# Patient Record
Sex: FEMALE | Race: WHITE | Employment: FULL TIME | URBAN - METROPOLITAN AREA
[De-identification: names, ages, dates, MRNs, and addresses within clinical notes are randomized per-mention and may not be internally consistent; named-entity substitution may affect disease eponyms.]

---

## 2021-03-19 ENCOUNTER — APPOINTMENT (OUTPATIENT)
Dept: CT IMAGING | Age: 66
DRG: 065 | End: 2021-03-19
Payer: COMMERCIAL

## 2021-03-19 ENCOUNTER — HOSPITAL ENCOUNTER (INPATIENT)
Age: 66
LOS: 3 days | Discharge: HOME | DRG: 065 | End: 2021-03-22
Attending: EMERGENCY MEDICINE | Admitting: INTERNAL MEDICINE
Payer: COMMERCIAL

## 2021-03-19 DIAGNOSIS — R47.01 APHASIA: Primary | ICD-10-CM

## 2021-03-19 DIAGNOSIS — I63.9 ISCHEMIC STROKE (HCC): ICD-10-CM

## 2021-03-19 DIAGNOSIS — R13.12 OROPHARYNGEAL DYSPHAGIA: ICD-10-CM

## 2021-03-19 DIAGNOSIS — R29.810 FACIAL DROOP: ICD-10-CM

## 2021-03-19 LAB
ANION GAP SERPL CALCULATED.3IONS-SCNC: 8 MMOL/L (ref 3–16)
BASOPHILS ABSOLUTE: 0.1 K/UL (ref 0–0.2)
BASOPHILS RELATIVE PERCENT: 0.8 %
BUN BLDV-MCNC: 9 MG/DL (ref 7–20)
CALCIUM SERPL-MCNC: 8.7 MG/DL (ref 8.3–10.6)
CHLORIDE BLD-SCNC: 99 MMOL/L (ref 99–110)
CO2: 27 MMOL/L (ref 21–32)
CREAT SERPL-MCNC: 0.5 MG/DL (ref 0.6–1.2)
EKG ATRIAL RATE: 124 BPM
EKG DIAGNOSIS: NORMAL
EKG P AXIS: 65 DEGREES
EKG P-R INTERVAL: 172 MS
EKG Q-T INTERVAL: 306 MS
EKG QRS DURATION: 78 MS
EKG QTC CALCULATION (BAZETT): 439 MS
EKG R AXIS: 0 DEGREES
EKG T AXIS: 69 DEGREES
EKG VENTRICULAR RATE: 124 BPM
EOSINOPHILS ABSOLUTE: 0.1 K/UL (ref 0–0.6)
EOSINOPHILS RELATIVE PERCENT: 1.2 %
GFR AFRICAN AMERICAN: >60
GFR NON-AFRICAN AMERICAN: >60
GLUCOSE BLD-MCNC: 104 MG/DL (ref 70–99)
HCT VFR BLD CALC: 40.7 % (ref 36–48)
HEMOGLOBIN: 13.2 G/DL (ref 12–16)
INR BLD: 1.09 (ref 0.86–1.14)
LYMPHOCYTES ABSOLUTE: 1.5 K/UL (ref 1–5.1)
LYMPHOCYTES RELATIVE PERCENT: 20.3 %
MCH RBC QN AUTO: 28.3 PG (ref 26–34)
MCHC RBC AUTO-ENTMCNC: 32.5 G/DL (ref 31–36)
MCV RBC AUTO: 86.9 FL (ref 80–100)
MONOCYTES ABSOLUTE: 0.5 K/UL (ref 0–1.3)
MONOCYTES RELATIVE PERCENT: 6.9 %
NEUTROPHILS ABSOLUTE: 5.2 K/UL (ref 1.7–7.7)
NEUTROPHILS RELATIVE PERCENT: 70.8 %
PDW BLD-RTO: 15.2 % (ref 12.4–15.4)
PLATELET # BLD: 223 K/UL (ref 135–450)
PMV BLD AUTO: 8.4 FL (ref 5–10.5)
POTASSIUM REFLEX MAGNESIUM: 3.7 MMOL/L (ref 3.5–5.1)
PROTHROMBIN TIME: 12.7 SEC (ref 10–13.2)
RBC # BLD: 4.68 M/UL (ref 4–5.2)
SODIUM BLD-SCNC: 134 MMOL/L (ref 136–145)
TROPONIN: <0.01 NG/ML
WBC # BLD: 7.4 K/UL (ref 4–11)

## 2021-03-19 PROCEDURE — 2580000003 HC RX 258: Performed by: INTERNAL MEDICINE

## 2021-03-19 PROCEDURE — 6370000000 HC RX 637 (ALT 250 FOR IP): Performed by: INTERNAL MEDICINE

## 2021-03-19 PROCEDURE — 6360000004 HC RX CONTRAST MEDICATION: Performed by: STUDENT IN AN ORGANIZED HEALTH CARE EDUCATION/TRAINING PROGRAM

## 2021-03-19 PROCEDURE — 70498 CT ANGIOGRAPHY NECK: CPT

## 2021-03-19 PROCEDURE — 85610 PROTHROMBIN TIME: CPT

## 2021-03-19 PROCEDURE — 84484 ASSAY OF TROPONIN QUANT: CPT

## 2021-03-19 PROCEDURE — 85025 COMPLETE CBC W/AUTO DIFF WBC: CPT

## 2021-03-19 PROCEDURE — 94761 N-INVAS EAR/PLS OXIMETRY MLT: CPT

## 2021-03-19 PROCEDURE — 6360000002 HC RX W HCPCS: Performed by: INTERNAL MEDICINE

## 2021-03-19 PROCEDURE — 99284 EMERGENCY DEPT VISIT MOD MDM: CPT

## 2021-03-19 PROCEDURE — 2700000000 HC OXYGEN THERAPY PER DAY

## 2021-03-19 PROCEDURE — 2060000000 HC ICU INTERMEDIATE R&B

## 2021-03-19 PROCEDURE — 93005 ELECTROCARDIOGRAM TRACING: CPT | Performed by: STUDENT IN AN ORGANIZED HEALTH CARE EDUCATION/TRAINING PROGRAM

## 2021-03-19 PROCEDURE — 70450 CT HEAD/BRAIN W/O DYE: CPT

## 2021-03-19 PROCEDURE — 80048 BASIC METABOLIC PNL TOTAL CA: CPT

## 2021-03-19 RX ORDER — PROMETHAZINE HYDROCHLORIDE 12.5 MG/1
12.5 TABLET ORAL EVERY 6 HOURS PRN
Status: DISCONTINUED | OUTPATIENT
Start: 2021-03-19 | End: 2021-03-22 | Stop reason: HOSPADM

## 2021-03-19 RX ORDER — ASPIRIN 81 MG/1
81 TABLET ORAL DAILY
Status: DISCONTINUED | OUTPATIENT
Start: 2021-03-19 | End: 2021-03-22 | Stop reason: HOSPADM

## 2021-03-19 RX ORDER — NALOXONE HYDROCHLORIDE 0.4 MG/ML
INJECTION, SOLUTION INTRAMUSCULAR; INTRAVENOUS; SUBCUTANEOUS
Status: DISCONTINUED
Start: 2021-03-19 | End: 2021-03-19

## 2021-03-19 RX ORDER — SODIUM CHLORIDE 0.9 % (FLUSH) 0.9 %
10 SYRINGE (ML) INJECTION PRN
Status: DISCONTINUED | OUTPATIENT
Start: 2021-03-19 | End: 2021-03-22 | Stop reason: HOSPADM

## 2021-03-19 RX ORDER — ASPIRIN 300 MG/1
300 SUPPOSITORY RECTAL DAILY
Status: DISCONTINUED | OUTPATIENT
Start: 2021-03-19 | End: 2021-03-22 | Stop reason: HOSPADM

## 2021-03-19 RX ORDER — ONDANSETRON 2 MG/ML
4 INJECTION INTRAMUSCULAR; INTRAVENOUS EVERY 6 HOURS PRN
Status: DISCONTINUED | OUTPATIENT
Start: 2021-03-19 | End: 2021-03-22 | Stop reason: HOSPADM

## 2021-03-19 RX ORDER — ROSUVASTATIN CALCIUM 20 MG/1
40 TABLET, COATED ORAL NIGHTLY
Status: DISCONTINUED | OUTPATIENT
Start: 2021-03-19 | End: 2021-03-20

## 2021-03-19 RX ORDER — LABETALOL 20 MG/4 ML (5 MG/ML) INTRAVENOUS SYRINGE
10 EVERY 10 MIN PRN
Status: DISCONTINUED | OUTPATIENT
Start: 2021-03-19 | End: 2021-03-19

## 2021-03-19 RX ORDER — SODIUM CHLORIDE 9 MG/ML
INJECTION, SOLUTION INTRAVENOUS CONTINUOUS
Status: DISCONTINUED | OUTPATIENT
Start: 2021-03-19 | End: 2021-03-21

## 2021-03-19 RX ORDER — LABETALOL HYDROCHLORIDE 5 MG/ML
10 INJECTION, SOLUTION INTRAVENOUS EVERY 10 MIN PRN
Status: DISCONTINUED | OUTPATIENT
Start: 2021-03-19 | End: 2021-03-22 | Stop reason: HOSPADM

## 2021-03-19 RX ORDER — POLYETHYLENE GLYCOL 3350 17 G/17G
17 POWDER, FOR SOLUTION ORAL DAILY PRN
Status: DISCONTINUED | OUTPATIENT
Start: 2021-03-19 | End: 2021-03-22 | Stop reason: HOSPADM

## 2021-03-19 RX ORDER — SODIUM CHLORIDE 0.9 % (FLUSH) 0.9 %
10 SYRINGE (ML) INJECTION EVERY 12 HOURS SCHEDULED
Status: DISCONTINUED | OUTPATIENT
Start: 2021-03-19 | End: 2021-03-22 | Stop reason: HOSPADM

## 2021-03-19 RX ADMIN — ENOXAPARIN SODIUM 40 MG: 40 INJECTION SUBCUTANEOUS at 18:36

## 2021-03-19 RX ADMIN — SODIUM CHLORIDE: 9 INJECTION, SOLUTION INTRAVENOUS at 18:36

## 2021-03-19 RX ADMIN — ASPIRIN 81 MG: 81 TABLET, COATED ORAL at 18:36

## 2021-03-19 RX ADMIN — Medication 10 ML: at 21:00

## 2021-03-19 RX ADMIN — IOPAMIDOL 80 ML: 755 INJECTION, SOLUTION INTRAVENOUS at 11:59

## 2021-03-19 ASSESSMENT — ENCOUNTER SYMPTOMS
DIARRHEA: 0
ABDOMINAL PAIN: 0
SHORTNESS OF BREATH: 0
BACK PAIN: 0
CONSTIPATION: 0
RHINORRHEA: 0
NAUSEA: 0
COUGH: 0
VOMITING: 0
SORE THROAT: 0

## 2021-03-19 ASSESSMENT — PAIN SCALES - GENERAL: PAINLEVEL_OUTOF10: 0

## 2021-03-19 NOTE — FLOWSHEET NOTE
03/19/21 1325   Encounter Summary   Services provided to: Patient   Referral/Consult From: Rounding   Continue Visiting   (3/19/2021, [facility]. )   Complexity of Encounter Moderate   Length of Encounter 15 minutes   Routine   Type Initial   Assessment Approachable   Intervention Nurtured hope   Outcome Expressed gratitude

## 2021-03-19 NOTE — H&P
Hospital Medicine History & Physical      PCP: Referring Not In System (Inactive)    Date of Admission: 3/19/2021    Date of Service: Pt seen/examined on 03/19/21 and Admitted to Inpatient with expected LOS greater than two midnights due to medical therapy. Chief Complaint: slurred speech, facial droop on left    History Of Present Illness:      Stephanie Guerrero is a 60 y.o. female with past medical history of rectal prolapse but no other known significant history (has not seen a doctor for 20 years), who presented from her workplace after her employer noted she was not speaking properly and called 911. She says she \"felt funny\" at about 10 this morning and noticed her speech was different, seemed slurred. No vision changes. No weakness except for ?left arm weakness compared to right. She also felt like her chewing/swallowing is different. On arrival to ED she was noted to have left sided facial droop and some dysarthria. Stroke team was notified. She did not receive tPA due to mild symptoms. Past Medical History:      No past medical history on file. Past Surgical History:      No past surgical history on file. Medications Prior to Admission:      Prior to Admission medications    Not on File       Allergies:  Patient has no known allergies. Social History:      The patient currently lives in private residence. TOBACCO:   has no history on file for tobacco.  ETOH:   has no history on file for alcohol. Family History:      Reviewed in detail and positive as follows:    No family history on file. REVIEW OF SYSTEMS:   Pertinent positives as noted in the HPI. All other systems reviewed and negative. PHYSICAL EXAM:    /71   Pulse 97   Temp 98.3 °F (36.8 °C) (Oral)   Resp 30   Ht 5' (1.524 m)   Wt 243 lb 0.5 oz (110.2 kg)   SpO2 96%   BMI 47.46 kg/m²     General appearance: No apparent distress, appears stated age and cooperative.   HEENT: Normal cephalic, atraumatic without obvious deformity. Pupils equal, round, and reactive to light. Extra ocular muscles intact. Conjunctivae/corneas clear. Neck: Supple, with full range of motion. No jugular venous distention. Trachea midline. Respiratory:  Normal respiratory effort. Clear to auscultation, bilaterally without Rales/Wheezes/Rhonchi. Cardiovascular: Regular rate and rhythm with normal S1/S2 without murmurs, rubs or gallops. Abdomen: Soft, non-tender, non-distended with normal bowel sounds. Musculoskelatal: No clubbing, cyanosis or edema bilaterally. Full range of motion without deformity. Skin: Skin color, texture, turgor normal.  No rashes or lesions. Neurologic:  Left facial droop mild dysarthria however patient also has slipping denture. Strength is good and sensation is intact and symmetric  Psychiatric: Alert and oriented, thought content appropriate, normal insight    Labs:     Recent Labs     03/19/21  1222   WBC 7.4   HGB 13.2   HCT 40.7        Recent Labs     03/19/21  1222   *   K 3.7   CL 99   CO2 27   BUN 9   CREATININE 0.5*   CALCIUM 8.7     No results for input(s): AST, ALT, BILIDIR, BILITOT, ALKPHOS in the last 72 hours. Recent Labs     03/19/21  1222   INR 1.09     Recent Labs     03/19/21  1352   TROPONINI <0.01       Urinalysis:    No results found for: NITRU, WBCUA, BACTERIA, RBCUA, BLOODU, SPECGRAV, GLUCOSEU    Studies:  CTA HEAD NECK W CONTRAST   Final Result      No significant flow-limiting stenosis or occlusion involving the anterior or posterior circulation      CTA NECK:   Dose modulation, iterative reconstruction and/or weight based adjustments of the mA/kV were utilized to reduce radiation dose to as low as reasonably achievable. Multiplanar reconstructed images and MIP images for CT angiogram.      Mild reticulation with mild diffuse bilateral ground glass opacities may relate to atelectasis pneumonitis or edema or acute atypical viral illness.  Small bilateral hilar lymph nodes are noted indeterminate. There is calcification of the thoracic aorta. The proximal left subclavian artery, brachycephalic artery, proximal right subclavian artery are patent without significant flow-limiting stenosis or occlusion. The left common carotid artery and right common    carotid artery patent without significant flow-limiting stenosis or occlusion. Mild calcification is noted. The internal carotid arteries are patent without significant flow-limiting stenosis or occlusion. The right and left vertebral arteries are patent without significant flow-limiting stenosis or occlusion. The basilar artery is patent without significant flow-limiting stenosis or occlusion. IMPRESSION:      No significant flow-limiting stenosis or occlusion involving the common carotid or internal carotid arteries or vertebral arteries. Reticulation with groundglass opacities involving the visualized lungs may relate to atelectasis pneumonitis or edema. Mildly enlarged visualized bilateral hilar lymph nodes may be inflammatory, infectious or neoplastic. CT HEAD WO CONTRAST   Final Result      No acute hemorrhage. ASSESSMENT:    Active Hospital Problems    Diagnosis Date Noted    Ischemic stroke ([facility] [address] [I63.9] 03/19/2021   HTN  ? Dysphagia  Sinus tachycardia    PLAN:    ASA, statin  Permissive HTN for first 24 hours  Labs: Lipids, Hgb A1c  CT head, CTA head with no acute findings  MRI brain pending  Echocardiogram with bubble  Telemetry   Neurology consulted, appreciate recs  PT/OT  NPO pending SLP evaluation  Gentle IV fluids  Monitor blood pressure    DVT Prophylaxis: Lovenox  Diet: No diet orders on file  Code Status: No Order    PT/OT Eval Status: pending    Dispo - Inpatient    Amaya Lanier DO

## 2021-03-19 NOTE — ED PROVIDER NOTES
[address] [facility]          ATTENDING PHYSICIAN NOTE       Date of evaluation: 3/19/2021    Chief Complaint     No chief complaint on file. History of Present Illness     Sofia Barnes is an otherwise healthy 60 y.o. female who presents with slurred speech. Patient states that approximately 1030 this morning she had sudden onset slurred speech while eating breakfast.  Coworkers also noted that she had left sided facial droop at that time. No weakness or sensation changes. She denies history of similar problems. Patient is not anticoagulated. She feels otherwise well. No fevers or chills. No headache. No vision changes. No chest pain or shortness of breath. No abdominal pain, nausea, vomiting. Review of Systems     Review of Systems   Constitutional: Negative for chills and fever. HENT: Negative for congestion, rhinorrhea and sore throat. Respiratory: Negative for cough and shortness of breath. Cardiovascular: Negative for chest pain and leg swelling. Gastrointestinal: Negative for abdominal pain, constipation, diarrhea, nausea and vomiting. Genitourinary: Negative for dysuria, frequency, hematuria and urgency. Musculoskeletal: Negative for back pain and neck pain. Skin: Negative for rash and wound. Neurological: Positive for facial asymmetry (Left side) and speech difficulty. Negative for syncope, weakness, light-headedness, numbness and headaches. All other systems reviewed and are negative. Past Medical, Surgical, Family, and Social History     She has no past medical history on file. She has no past surgical history on file. Her family history is not on file. She     Medications     Previous Medications    No medications on file       Allergies     She has No Known Allergies.     Physical Exam     INITIAL VITALS: BP: (!) 209/110, Temp: 98.3 °F (36.8 °C), Pulse: 128, Resp: 18, SpO2: 90 %   Physical Exam  Constitutional: General: She is not in acute distress. Appearance: Normal appearance. She is normal weight. She is not ill-appearing or toxic-appearing. HENT:      Head: Normocephalic and atraumatic. Nose: Nose normal.      Mouth/Throat:      Mouth: Mucous membranes are moist.   Eyes:      Extraocular Movements: Extraocular movements intact. Conjunctiva/sclera: Conjunctivae normal.      Pupils: Pupils are equal, round, and reactive to light. Neck:      Musculoskeletal: Normal range of motion. Cardiovascular:      Rate and Rhythm: Normal rate. Heart sounds: No murmur. Pulmonary:      Effort: Pulmonary effort is normal. No respiratory distress. Breath sounds: Normal breath sounds. No wheezing or rales. Abdominal:      General: Abdomen is flat. There is no distension. Palpations: Abdomen is soft. Tenderness: There is no abdominal tenderness. Musculoskeletal: Normal range of motion. Skin:     General: Skin is warm and dry. Capillary Refill: Capillary refill takes less than 2 seconds. Findings: No rash. Neurological:      Mental Status: She is alert. Comments: Minor left lower facial droop present. Slurred speech present. CNII-XII otherwise intact. No pronator drift. 5/5 strength to bilateral upper and lower extremities. Sensation intact throughout. NIH STROKE SCALE  NIH Stroke Scale  Interval: Baseline  Level of Consciousness (1a. ): Alert  LOC Questions (1b. ):  Answers both correctly  LOC Commands (1c. ): Performs both tasks correctly  Best Gaze (2. ): Normal  Visual (3. ): No visual loss  Facial Palsy (4. ): (!) Minor paralysis  Motor Arm, Left (5a. ): No drift  Motor Arm, Right (5b. ): No drift  Motor Leg, Left (6a. ): No drift  Motor Leg, Right (6b. ): No drift  Limb Ataxia (7. ): Absent  Sensory (8. ): Normal  Best Language (9. ): No aphasia  Dysarthria (10. ): Mild to moderate dysarthria  Extinction and Inattention (11): No abnormality  Total: 2 Diagnostic Results     EKG   EKG Interpretation    Interpreted by emergency department physician    Rhythm: sinus tachycardia  Rate: 120-130  Axis: normal  Ectopy: none  Conduction: normal  ST Segments: no acute change  T Waves: no acute change  Q Waves: none    Clinical Impression: Sinus tachycardia, no ST or T wave changes    Kenton Hahn      RADIOLOGY:  CTA HEAD NECK W CONTRAST   Final Result      No significant flow-limiting stenosis or occlusion involving the anterior or posterior circulation      CTA NECK:   Dose modulation, iterative reconstruction and/or weight based adjustments of the mA/kV were utilized to reduce radiation dose to as low as reasonably achievable. Multiplanar reconstructed images and MIP images for CT angiogram.      Mild reticulation with mild diffuse bilateral ground glass opacities may relate to atelectasis pneumonitis or edema or acute atypical viral illness. Small bilateral hilar lymph nodes are noted indeterminate. There is calcification of the thoracic aorta. The proximal left subclavian artery, brachycephalic artery, proximal right subclavian artery are patent without significant flow-limiting stenosis or occlusion. The left common carotid artery and right common    carotid artery patent without significant flow-limiting stenosis or occlusion. Mild calcification is noted. The internal carotid arteries are patent without significant flow-limiting stenosis or occlusion. The right and left vertebral arteries are patent without significant flow-limiting stenosis or occlusion. The basilar artery is patent without significant flow-limiting stenosis or occlusion. IMPRESSION:      No significant flow-limiting stenosis or occlusion involving the common carotid or internal carotid arteries or vertebral arteries. Reticulation with groundglass opacities involving the visualized lungs may relate to atelectasis pneumonitis or edema.  Mildly enlarged visualized bilateral hilar lymph nodes may be inflammatory, infectious or neoplastic. CT HEAD WO CONTRAST   Final Result      No acute hemorrhage. LABS:   Results for orders placed or performed during the hospital encounter of 03/19/21   CBC Auto Differential   Result Value Ref Range    WBC 7.4 4.0 - 11.0 K/uL    RBC 4.68 4.00 - 5.20 M/uL    Hemoglobin 13.2 12.0 - 16.0 g/dL    Hematocrit 40.7 36.0 - 48.0 %    MCV 86.9 80.0 - 100.0 fL    MCH 28.3 26.0 - 34.0 pg    MCHC 32.5 31.0 - 36.0 g/dL    RDW 15.2 12.4 - 15.4 %    Platelets 528 558 - 317 K/uL    MPV 8.4 5.0 - 10.5 fL    Neutrophils % 70.8 %    Lymphocytes % 20.3 %    Monocytes % 6.9 %    Eosinophils % 1.2 %    Basophils % 0.8 %    Neutrophils Absolute 5.2 1.7 - 7.7 K/uL    Lymphocytes Absolute 1.5 1.0 - 5.1 K/uL    Monocytes Absolute 0.5 0.0 - 1.3 K/uL    Eosinophils Absolute 0.1 0.0 - 0.6 K/uL    Basophils Absolute 0.1 0.0 - 0.2 K/uL   Basic Metabolic Panel w/ Reflex to MG   Result Value Ref Range    Sodium 134 (L) 136 - 145 mmol/L    Potassium reflex Magnesium 3.7 3.5 - 5.1 mmol/L    Chloride 99 99 - 110 mmol/L    CO2 27 21 - 32 mmol/L    Anion Gap 8 3 - 16    Glucose 104 (H) 70 - 99 mg/dL    BUN 9 7 - 20 mg/dL    CREATININE 0.5 (L) 0.6 - 1.2 mg/dL    GFR Non-African American >60 >60    GFR African American >60 >60    Calcium 8.7 8.3 - 10.6 mg/dL   Protime-INR   Result Value Ref Range    Protime 12.7 10.0 - 13.2 sec    INR 1.09 0.86 - 1.14   Troponin   Result Value Ref Range    Troponin <0.01 <0.01 ng/mL   EKG 12 Lead   Result Value Ref Range    Ventricular Rate 124 BPM    Atrial Rate 124 BPM    P-R Interval 172 ms    QRS Duration 78 ms    Q-T Interval 306 ms    QTc Calculation (Bazett) 439 ms    P Axis 65 degrees    R Axis 0 degrees    T Axis 69 degrees    Diagnosis       EKG performed in ER and to be interpreted by ER physician. Confirmed by MD, ER ([id number]),  Yasmany Lorenzo (ANDREW), NORBERT ([id number]) on 3/19/2021 1:13:18 PM       RECENT VITALS:  BP: 136/71, Temp: 98.3 °F (36.8 °C), Pulse: 97, Resp: 30, SpO2: 96 %    Procedures     None. ED Course     Nursing Notes, Past Medical Hx, Past Surgical Hx, Social Hx, Allergies, and Family Hx were reviewed. The patient was given the following medications:  Orders Placed This Encounter   Medications    iopamidol (ISOVUE-370) 76 % injection 80 mL    naloxone (NARCAN) 0.4 MG/ML injection     Gordon Thompson: cabinet override    alteplase (ACTIVASE) 100 MG injection     Ananth Moore: cabinet override       CONSULTS:  Cara Camejo was contacted with hospital medicine. Robinson Fuchs and Dr. Cassie Birmingham were contacted as part of the [place] stroke team.  They recommend proceeding with CT/CTA. After reading the CT/CTA they recommend inform discussion with patient regarding TPA. Patient declined TPA after this discussion. [place] stroke team signed off at this time. Sergei Katerine / ASSESSMENT / Ernestine Confer is an otherwise healthy 60 y.o. female with left-sided facial droop and slurred speech consistent with possible stroke. NIH stroke scale 2. Point-of-care glucose obtained and 89. Given concern for stroke, patient taken immediately to the CT scanner. CT/CTA head were obtained. There is no acute hemorrhage and no LVO seen. Extensive work-up was obtained. CBC is in normal limits. BMP is unremarkable. Troponin is negative. PT/INR obtained. Given concern for stroke, [place] stroke team was called and has reviewed the images. They recommended informed discussion regarding TPA with the patient. I had a lengthy discussion with the patient regarding the risks and benefits of TPA especially given her minimal deficit. Patient and daughter agree that patient's deficits are not debilitating and they do not wish to proceed with TPA. tPA was not given secondary to patient preference as described above.   Plan was made admit the patient to medicine for further work-up and evaluation of her facial droop and slurred speech. Patient was admitted to medicine stable condition. The risks and benefits of administration of t-PA were discussed withthe patient and/or family members [Healthcare POA if pertinent] and they indicate an understanding of these risks and benefits. t-PA NOT given due to the following EXCLUSION CRITERIA (only those checked):  [] Pregnancy  [] Symptoms > 4.5 hours of onset  [] Last known well cannot be accurately determined  [x] Minor or isolated neurological signs  [] Rapid improvement of stroke symptoms  [] Seizure at the same time of stroke symptoms  [] Active bleeding or acute trauma (fracture)  [] Presentation consistent with acute MI or post-MI pericarditis  [] Known intracranial neoplasm, AV malformation or aneurysm  [] CT evidence of intracranial hemorrhage  [] Any prior history of intracranial hemorrhage  [] Symptoms suggestive of subarachnoid hemorrhage (even if head CT normal)  [x] Persistent hypertension (SBP>185 or DBP>110)  [] Glucose < 50 or > 400. [] Bleeding diathesis, including but not limited to:   Platelets < 114,587   -Heparin within 48 hours with PTT > normal range   -Current or recent use of anticoagulants (dabagtran, rivaroxaban, or warfarin with     INR > 1.7)  [] Lumbar puncture in past 7days  [] Arterial puncture at a noncompressible site in past 7 days  [] Major surgery in past 14 days  [] Gastrointestinal or urinary tract hemorrhage in past 21 days  [] Myocardial infarction in past 3 months  [] Stroke, intracranialsurgery or serious head trauma in past 3 months    Acute Stroke Core Measures:   Last Known Well: 56  NIH Stroke ScaleTotal: 2  t-PA Eligibility: IV t-PA was considered and not given due to violations in inclusion criteria including mild/rapidly resolving deficit      Clinical Impression     1. Aphasia    2. Facial droop        Disposition     PATIENT REFERRED TO:  No follow-up provider specified.     DISCHARGE MEDICATIONS:  New Prescriptions    No medications on file       DISPOSITION Decision To Admit 03/19/2021 12:48:25 PM     Toshia Paulino MD  03/19/21 1221

## 2021-03-19 NOTE — PROGRESS NOTES
Patient admitted to 5517 from the ED. Alert/oriented x4. Vital signs stable. Denies having any pain. NIH score is a 2 for L sided facial droop and slurred speech. Endorsing full sensation to all extremities. Clear breath sounds, active bowel tones. Passed swallow screen with no difficulties. Fall precautions in place. Bed alarm engaged and call light within reach. 4 eyes skin assessment completed with second RN. Skin intact. Admission completed. Son at bedside and educated on visitor policy. Will continue to monitor. Stroke Admission    I agree as the admission nurse that I have completed a thorough stroke assessment and completed the admission on the patient. ALL assessment areas listed below have been addressed and completed. Presentation: Ischemic    --PCA brought up patient, did not do NIH handoff     Current NIHSS= 2 (left sided facial droop, slurred speech). [x]   Education Assessment  [x]   Individualized Stroke/TIA Education template added, including patient specific risk factors: Hypertension (was hypertensive on arrival; medical history not on file)   [x]   Individualized Stroke/TIA Care Plan template added  [x]   Swallow screen completed using the [employer] Protocol, and documented on flowsheet PRIOR to oral intake: Pass  [x]   VTE Prophylaxis: SCDs ordered/addressed; SCDs: lovenox ordered            (If Medication, choose N/A and write Medication name.  ASA, Plavix and TPA are not VTE prophylaxis. )  [x]   Stroke education booklet given, and education initiated with patient and/or caregiver      Nurse eSignature: Electronically signed by Trang Bazan RN on 3/19/21 at 4:29 PM EDT

## 2021-03-19 NOTE — CONSULTS
HR93  /67  SpO2 94% on RA     General Alert, no distress, well-nourished  Cardiovascular: Heart RRR, no murmurs, carotids no bruits, pulses symmetric in all 4 extremities. 1+ peripheral edema. Psychiatric: cooperative with examination, no  psychotic behavior noted. Neurologic  Mental status:   orientation to person and place   General fund of knowledge grossly intact   Memory grossly intact   Attention intact as able to attend well to the exam     Language fluent in conversation   Comprehension intact; follows simple commands  Cranial nerves:   CN2: Visual Fields full w/o extinction on confrontational testing,   CN 3,4,6: extraocular muscles intact,  CN5: facial sensation symmetric   CN7:face with left central weakness with dysarthria,   CN8: hearing grossly intact  CN9: palate elevated symmetrically  CN11: trap full strength on shoulder shrug  CN12: tongue midline with protrusion  Strength:  + left prontator drift. Good strength in all 4 extremities otherwise  Sensory: light touch intact in all 4 extremities. No sensory extinction on double simultaneous stimulation  Cerebellar/coordination: finger nose finger normal without ataxia, a little slower on left  Tone: normal in all 4 extremities  Gait:deferred for safety      Labs    CBC:  Recent Labs     03/19/21  1222   WBC 7.4   RBC 4.68   HGB 13.2   HCT 40.7   MCV 86.9   RDW 15.2        CHEMISTRIES:  Recent Labs     03/19/21  1222   *   K 3.7   CL 99   CO2 27   BUN 9   CREATININE 0.5*   GLUCOSE 104*     PT/INR:  Recent Labs     03/19/21  1222   PROTIME 12.7   INR 1.09     APTT:No results for input(s): APTT in the last 72 hours. LIVER PROFILE:No results for input(s): AST, ALT, BILIDIR, BILITOT, ALKPHOS in the last 72 hours. Imaging/Diagnostics   Ct Head Wo Contrast    Result Date: 3/19/2021  No acute hemorrhage.     Cta Head Neck W Contrast    Result Date: 3/19/2021  No significant flow-limiting stenosis or occlusion involving the anterior or posterior circulation CTA NECK: Dose modulation, iterative reconstruction and/or weight based adjustments of the mA/kV were utilized to reduce radiation dose to as low as reasonably achievable. Multiplanar reconstructed images and MIP images for CT angiogram. Mild reticulation with mild diffuse bilateral ground glass opacities may relate to atelectasis pneumonitis or edema or acute atypical viral illness. Small bilateral hilar lymph nodes are noted indeterminate. There is calcification of the thoracic aorta. The proximal left subclavian artery, brachycephalic artery, proximal right subclavian artery are patent without significant flow-limiting stenosis or occlusion. The left common carotid artery and right common  carotid artery patent without significant flow-limiting stenosis or occlusion. Mild calcification is noted. The internal carotid arteries are patent without significant flow-limiting stenosis or occlusion. The right and left vertebral arteries are patent without significant flow-limiting stenosis or occlusion. The basilar artery is patent without significant flow-limiting stenosis or occlusion. IMPRESSION: No significant flow-limiting stenosis or occlusion involving the common carotid or internal carotid arteries or vertebral arteries. Reticulation with groundglass opacities involving the visualized lungs may relate to atelectasis pneumonitis or edema. Mildly enlarged visualized bilateral hilar lymph nodes may be inflammatory, infectious or neoplastic.       Assessment      Hospital Problems           Last Modified POA    Ischemic stroke ([facility] [address] 3/19/2021 Yes          Plan     Obtain MRI of the brain wo contrast to eval for stroke  -CTA head and neck was done without LVO  -Echocardiogram with bubble  -Nursing bedside swallow before PO   -ASA 81mg PO daily  -Atorvastatin 80mg PO QHS  -SCDs for DVT prophylaxis  -PT/OT: eval and treat, PMR consult if rehab appropriate   -ST: eval and treat and dysphagia screen  -Allow BP to autoregulate for first 24 hours after stroke and treat BP >220/110 with labetalol   -Q4H neuro checks  -Q4H vital signs  -Check lipid panel and hemoglobin A1C  -Telemetry     Neurology will follow.     Electronically signed by Susanne Harris MD on 3/19/21 at 3:35 PM EDT

## 2021-03-19 NOTE — PROGRESS NOTES
4 Eyes Admission Assessment     I agree as the admission nurse that 2 RN's have performed a thorough Head to Toe Skin Assessment on the patient. ALL assessment sites listed below have been assessed on admission. Areas assessed by both nurses: yes  [x]   Head, Face, and Ears   [x]   Shoulders, Back, and Chest  [x]   Arms, Elbows, and Hands   [x]   Coccyx, Sacrum, and Ischium  [x]   Legs, Feet, and Heels        Does the Patient have Skin Breakdown?   No         Dale Prevention initiated:  No   Wound Care Orders initiated:  No      [facility] nurse consulted for Pressure Injury (Stage 3,4, Unstageable, DTI, NWPT, and Complex wounds) or Dale score 18 or lower:  No      Nurse 1 eSignature: Electronically signed by Roxie Frey RN on 3/19/21 at 4:20 PM EDT    **SHARE this note so that the co-signing nurse is able to place an eSignature**    Nurse 2 eSignature: Electronically signed by Iban Guerrero RN on 3/19/21 at 4:34 PM EDT

## 2021-03-20 ENCOUNTER — APPOINTMENT (OUTPATIENT)
Dept: MRI IMAGING | Age: 66
DRG: 065 | End: 2021-03-20
Payer: COMMERCIAL

## 2021-03-20 LAB
ALBUMIN SERPL-MCNC: 4 G/DL (ref 3.4–5)
ANION GAP SERPL CALCULATED.3IONS-SCNC: 7 MMOL/L (ref 3–16)
BUN BLDV-MCNC: 10 MG/DL (ref 7–20)
CALCIUM SERPL-MCNC: 9 MG/DL (ref 8.3–10.6)
CHLORIDE BLD-SCNC: 106 MMOL/L (ref 99–110)
CHOLESTEROL, TOTAL: 149 MG/DL (ref 0–199)
CO2: 29 MMOL/L (ref 21–32)
CREAT SERPL-MCNC: <0.5 MG/DL (ref 0.6–1.2)
GFR AFRICAN AMERICAN: >60
GFR NON-AFRICAN AMERICAN: >60
GLUCOSE BLD-MCNC: 111 MG/DL (ref 70–99)
HCT VFR BLD CALC: 40.6 % (ref 36–48)
HDLC SERPL-MCNC: 62 MG/DL (ref 40–60)
HEMOGLOBIN: 13.1 G/DL (ref 12–16)
LDL CHOLESTEROL CALCULATED: 76 MG/DL
LV EF: 50 %
LVEF MODALITY: NORMAL
MCH RBC QN AUTO: 28.3 PG (ref 26–34)
MCHC RBC AUTO-ENTMCNC: 32.3 G/DL (ref 31–36)
MCV RBC AUTO: 87.5 FL (ref 80–100)
PDW BLD-RTO: 15.4 % (ref 12.4–15.4)
PHOSPHORUS: 3.5 MG/DL (ref 2.5–4.9)
PLATELET # BLD: 219 K/UL (ref 135–450)
PMV BLD AUTO: 8.5 FL (ref 5–10.5)
POTASSIUM SERPL-SCNC: 4 MMOL/L (ref 3.5–5.1)
RBC # BLD: 4.64 M/UL (ref 4–5.2)
SODIUM BLD-SCNC: 142 MMOL/L (ref 136–145)
TRIGL SERPL-MCNC: 56 MG/DL (ref 0–150)
VLDLC SERPL CALC-MCNC: 11 MG/DL
WBC # BLD: 7.6 K/UL (ref 4–11)

## 2021-03-20 PROCEDURE — 80061 LIPID PANEL: CPT

## 2021-03-20 PROCEDURE — 36415 COLL VENOUS BLD VENIPUNCTURE: CPT

## 2021-03-20 PROCEDURE — 80069 RENAL FUNCTION PANEL: CPT

## 2021-03-20 PROCEDURE — 85027 COMPLETE CBC AUTOMATED: CPT

## 2021-03-20 PROCEDURE — 97162 PT EVAL MOD COMPLEX 30 MIN: CPT

## 2021-03-20 PROCEDURE — 97530 THERAPEUTIC ACTIVITIES: CPT

## 2021-03-20 PROCEDURE — 2060000000 HC ICU INTERMEDIATE R&B

## 2021-03-20 PROCEDURE — 6360000004 HC RX CONTRAST MEDICATION: Performed by: INTERNAL MEDICINE

## 2021-03-20 PROCEDURE — 92526 ORAL FUNCTION THERAPY: CPT

## 2021-03-20 PROCEDURE — 97165 OT EVAL LOW COMPLEX 30 MIN: CPT

## 2021-03-20 PROCEDURE — 70551 MRI BRAIN STEM W/O DYE: CPT

## 2021-03-20 PROCEDURE — 83036 HEMOGLOBIN GLYCOSYLATED A1C: CPT

## 2021-03-20 PROCEDURE — 92610 EVALUATE SWALLOWING FUNCTION: CPT

## 2021-03-20 PROCEDURE — 6370000000 HC RX 637 (ALT 250 FOR IP): Performed by: INTERNAL MEDICINE

## 2021-03-20 PROCEDURE — 2580000003 HC RX 258: Performed by: INTERNAL MEDICINE

## 2021-03-20 PROCEDURE — 97535 SELF CARE MNGMENT TRAINING: CPT

## 2021-03-20 PROCEDURE — 97116 GAIT TRAINING THERAPY: CPT

## 2021-03-20 PROCEDURE — C8929 TTE W OR WO FOL WCON,DOPPLER: HCPCS

## 2021-03-20 RX ORDER — LISINOPRIL 5 MG/1
5 TABLET ORAL DAILY
Status: DISCONTINUED | OUTPATIENT
Start: 2021-03-20 | End: 2021-03-22 | Stop reason: HOSPADM

## 2021-03-20 RX ORDER — ATORVASTATIN CALCIUM 80 MG/1
80 TABLET, FILM COATED ORAL NIGHTLY
Status: DISCONTINUED | OUTPATIENT
Start: 2021-03-20 | End: 2021-03-22 | Stop reason: HOSPADM

## 2021-03-20 RX ADMIN — ASPIRIN 81 MG: 81 TABLET, COATED ORAL at 11:06

## 2021-03-20 RX ADMIN — ATORVASTATIN CALCIUM 80 MG: 80 TABLET, FILM COATED ORAL at 21:34

## 2021-03-20 RX ADMIN — PERFLUTREN 1.65 MG: 6.52 INJECTION, SUSPENSION INTRAVENOUS at 10:01

## 2021-03-20 RX ADMIN — LISINOPRIL 5 MG: 5 TABLET ORAL at 13:36

## 2021-03-20 RX ADMIN — Medication 10 ML: at 21:35

## 2021-03-20 RX ADMIN — Medication 10 ML: at 11:07

## 2021-03-20 NOTE — PLAN OF CARE
Problem: HEMODYNAMIC STATUS  Goal: Patient has stable vital signs and fluid balance  Outcome: Ongoing  Note: Patient's vital signs will continue to remain stable. Problem: Falls - Risk of:  Goal: Will remain free from falls  Description: Will remain free from falls  Outcome: Ongoing  Note: Patient will remain free from falls. Patient will use call light to notify staff of needs prior to exiting the chair. Patient's bed will remain in lowest position and both chair/bed will have alarm engaged for safety.

## 2021-03-20 NOTE — PLAN OF CARE
Problem: Nutrition  Intervention: Swallowing evaluation  SLP completed evaluation. Please refer to notes in EMR.     Sasha Augustin M.A., [address]  Speech-Language Pathologist

## 2021-03-20 NOTE — PROGRESS NOTES
Speech Language Pathology  Facility/Department: [facility] 5T ORTHO/NEURO   CLINICAL BEDSIDE SWALLOW EVALUATION  & Treatment Note    NAME: Osorio Carlos  : 1955  MRN: [id number]    ADMISSION DATE: 3/19/2021  ADMITTING DIAGNOSIS: has Ischemic stroke ([address] on their problem list.  ONSET DATE: 2021    Recent Chest Xray/CT of Chest: none found in chart    Recent CTA Head Neck: (2021)  IMPRESSION:       No significant flow-limiting stenosis or occlusion involving the common carotid or internal carotid arteries or vertebral arteries.       Reticulation with groundglass opacities involving the visualized lungs may relate to atelectasis pneumonitis or edema. Mildly enlarged visualized bilateral hilar lymph nodes may be inflammatory, infectious or neoplastic. Date of Eval: 3/20/2021  Evaluating Therapist: Charlene Chavez    Current Diet level:  Current Diet : Regular  Current Liquid Diet : Thin      Primary Complaint  Patient Complaint: Patient reports swallow function seems back to baseline. However, she reports that baseline swallow function includes consistent coughing with all PO intake. Pain:  Pain Assessment  Pain Assessment: 0-10  Pain Level: 0    Reason for Referral  Osorio Carlos was referred for a bedside swallow evaluation to assess the efficiency of her swallow function, identify signs and symptoms of aspiration and make recommendations regarding safe dietary consistencies, effective compensatory strategies, and safe eating environment. Impression  Dysphagia Diagnosis: Suspected needs further assessment;Mild to moderate oral stage dysphagia;Mild to moderate pharyngeal stage dysphagia  Dysphagia Impression : Patient presents with suspected mild-moderate oropharyngeal dysphagia, needs further assessment.  Left sided facial asymmetry noted during oral motor exam. Voice is dysphonic (strained, pitch breaks, breathy); baseline per pt, as is coughing with PO intake (reports started when she was 3-6 yo, and had scarlet fever). Denies h/o pneumonia. Never seen by ENT. Assessed tolerance ice chips, thins via tsp/cup/straw, nectar thick via cup, honey thick via cup, puree, soft solid, and regular solids. Patient with positive oral acceptance, prolonged mastication, positive swallow onset, mild oral stasis regular solids. Consistent cough with thin liquids, infrequent cough with nectar, and no coughing with honey thick liquids. Tolerated puree/soft solids no s/s aspiration. While pt reports coughing with PO with no recorded h/o pneumonia at baseline, given concern for stroke/new left-sided weakness, recommend instrumental assessment to further evaluate pharyngeal/laryngeal function. D/t staffing, unable to complete this date (weekend); discussed with pt, who would like to continue PO intake. Recommend Dysphagia II Minced & Moist solids / Moderatly Thick (Honey) Liquids / meds in puree; as tolerated. Will continue to follow. Dysphagia Outcome Severity Scale: Level 4: Mild moderate dysphagia- Intermittent supervision/cueing. One - two diet consistencies restricted     Speech/language note: per patient, she is no longer experiencing slurred speech, reports back to baseline at this time. Continue to monitor need for further assessment. Treatment Plan  Requires SLP Intervention: Yes  Duration/Frequency of Treatment: 3-5x/wk for LOS  D/C Recommendations: To be determined  Referral To: ENT(dysphonic voice, dysphagia)    Recommended Diet and Intervention  Diet Solids Recommendation: Dysphagia Minced and Moist (Dysphagia II)  Liquid Consistency Recommendation: Moderately Thick (Honey)  Recommended Form of Meds: Meds in puree  Recommendations: FEES;Videostroboscopy; Dysphagia treatment; Modified barium swallow study  Therapeutic Interventions: Diet tolerance monitoring;Patient/Family education    Compensatory Swallowing Strategies  Compensatory Swallowing Strategies: Eat/Feed slowly;Upright as possible for all oral intake; Alternate solids and liquids;Small bites/sips; No straws    Treatment/Goals  Short-term Goals  Timeframe for Short-term Goals: 1-2 wks or LOS  Goal 1: Patient will tolerate least restrictive diet without overt signs of aspiration or associated decline in respiratory status. Goal 2: Patient caregiver will demonstrate understanding of swallowing concerns/recommendations. 3/20: Patient educated at length regarding anatomy and function of swallow/voice mechanism, dysphagia, dysphagia risks/concerns (aspiration, pneumonia, choking), s/s aspiration, instrumental swallowing/voice assessments, diet recommendations/options, thickener purpose/rationale, aspiration of thick vs thin liquids, safety strategies (small single sips, slow rate, upright position, alternate liquids/solids), importance of frequent oral hygiene. Pt stated good understanding. After discussion, pt decided she would like to try honey thick liquids/minced & moist solids, as she demonstrated improved tolerance with them, and rather than remain NPO waiting for instrumental assessment. Cont goal.  Goal 3: Patient will tolerate instrumental swallow assessment as appropriate. General  Chart Reviewed: Yes  Comments: Per admitting H&P (03/19/2021): 'Cecy Mathews is a 60 y.o. female with past medical history of rectal prolapse but no other known significant history (has not seen a doctor for 20 years), who presented from her workplace after her employer noted she was not speaking properly and called 911. She says she \"felt funny\" at about 10 this morning and noticed her speech was different, seemed slurred. No vision changes. No weakness except for ?left arm weakness compared to right. She also felt like her chewing/swallowing is different. On arrival to ED she was noted to have left sided facial droop and some dysarthria. Stroke team was notified.  She did not receive tPA due to mild symptoms.'  Subjective  Subjective: Patient awake, alert, pleasant. Seen seated up in chair, on room air, eating lunch (regular solids, thin liquids); noted to be coughing. Strained/dysphonic vocal quality. Behavior/Cognition: Alert; Cooperative;Pleasant mood  Respiratory Status: Room air  O2 Device: None (Room air)  Follows Directions: Simple  Dentition: Some missing teeth(loose fitting upper partial)  Patient Positioning: Upright in chair  Baseline Vocal Quality: Dysphonic(strained voice; pitch breaks; breathy; per pt, has been this way ever since she was 3-6 yo girl, and had scarlet fever. Also reports seasonal allergies and recent multi-wk bout of laryngitis. Says she has never seen an ENT.)  Volitional Cough: Strong  Prior Dysphagia History: No dysphagia history found in chart. Consistencies Administered: Reg solid; Dysphagia Soft and Bite-Sized (Dysphagia III); Dysphagia Minced and Moist (Dysphagia II); Dysphagia Pureed (Dysphagia I); Thin;Ice Chips; Thin - straw; Thin - cup; Thin - teaspoon;Nectar - cup;Honey - cup    Vision/Hearing  Vision  Vision: Within Functional Limits  Hearing  Hearing: Within functional limits    Oral Motor Deficits  Oral/Motor  Oral Motor: Exceptions to [facility]  Labial Symmetry: Abnormal symmetry left  Lingual Coordination: Reduced    Prognosis  Prognosis  Prognosis for safe diet advancement: good  Individuals consulted  Consulted and agree with results and recommendations: Patient;RN    Education  Patient Education: Educated pt re: role of SLP, purpose of visit, swallow function, diet recommendations. Patient Education Response: Verbalizes understanding  Safety Devices in place: Yes  Type of devices: Call light within reach;Nurse notified; Other (comment)       Therapy Time  SLP Individual Minutes  Time In: 1300  Time Out: 1350  Minutes: 48     SLP Total Treatment Time  Timed Code Treatment Minutes: 0 Minutes  Total Treatment Time: 50     Plan  Diet Recommendations: Downgrade to Dysphagia II Minced & Moist Solids / Moderately Thick (Honey) Liquids / meds in puree; as tolerated. If signs of aspiration or associated decline in respiratory status arise, recommend withhold PO and contact speech. Discharge Plan:  TBD  Discussed with RN, Ingrid Buchanan. Needs within reach. Electronically Signed by:  Betsey Witt M.A., [facility] [place] [facility]   Speech-Language Pathologist  Pager #[phone]    This document will serve as a discharge summary if pt discharges before next treatment.

## 2021-03-20 NOTE — PLAN OF CARE
Problem: ACTIVITY INTOLERANCE/IMPAIRED MOBILITY  Goal: Mobility/activity is maintained at optimum level for patient  Outcome: Ongoing     Problem: COMMUNICATION IMPAIRMENT  Goal: Ability to express needs and understand communication  Outcome: Ongoing

## 2021-03-20 NOTE — PROGRESS NOTES
Hospitalist Progress Note      PCP: Referring Not In System (Inactive)    Date of Admission: 3/19/2021    Chief Complaint: Slurred speech, facial droop on left    Subjective:   Facial droop is decreased, speech seems to be better. Notes she has had hoarse voice for many years. Medications:  Reviewed    Infusion Medications    sodium chloride 100 mL/hr at 03/19/21 1836     Scheduled Medications    sodium chloride flush  10 mL Intravenous 2 times per day    enoxaparin  40 mg Subcutaneous Daily    aspirin  81 mg Oral Daily    Or    aspirin  300 mg Rectal Daily    rosuvastatin  40 mg Oral Nightly     PRN Meds: sodium chloride flush, promethazine **OR** ondansetron, polyethylene glycol, perflutren lipid microspheres, labetalol      Intake/Output Summary (Last 24 hours) at 3/20/2021 0924  Last data filed at 3/20/2021 0829  Gross per 24 hour   Intake 270 ml   Output    Net 270 ml       Exam:    BP (!) 177/78   Pulse 83   Temp 97.8 °F (36.6 °C) (Oral)   Resp 16   Ht 5' (1.524 m)   Wt 243 lb 0.5 oz (110.2 kg)   SpO2 92%   BMI 47.46 kg/m²     General appearance: No apparent distress, appears stated age and cooperative. HEENT: Pupils equal, round, and reactive to light. Conjunctivae/corneas clear. Neck: Supple, with full range of motion. No jugular venous distention. Trachea midline. Respiratory:  Normal respiratory effort. Clear to auscultation, bilaterally without Rales/Wheezes/Rhonchi. Cardiovascular: Regular rate and rhythm with normal S1/S2 without murmurs, rubs or gallops. Abdomen: Soft, non-tender, non-distended with normal bowel sounds. Musculoskelatal: No clubbing, cyanosis or edema bilaterally. Skin: Skin color, texture, turgor normal.  No rashes or lesions.   Neurologic:  Cranial nerves: II-XII intact, grossly non-focal.  Psychiatric: Alert and oriented, thought content appropriate, normal insight    Labs:   Recent Labs     03/19/21  1222 03/20/21  0614   WBC 7.4 7.6   HGB 13.2 13.1 HCT 40.7 40.6    219     Recent Labs     03/19/21  1222 03/20/21  0614   * 142   K 3.7 4.0   CL 99 106   CO2 27 29   BUN 9 10   CREATININE 0.5* <0.5*   CALCIUM 8.7 9.0   PHOS  --  3.5     No results for input(s): AST, ALT, BILIDIR, BILITOT, ALKPHOS in the last 72 hours. Recent Labs     03/19/21  1222   INR 1.09     Recent Labs     03/19/21  1352   TROPONINI <0.01       Studies:  CTA HEAD NECK W CONTRAST   Final Result      No significant flow-limiting stenosis or occlusion involving the anterior or posterior circulation      CTA NECK:   Dose modulation, iterative reconstruction and/or weight based adjustments of the mA/kV were utilized to reduce radiation dose to as low as reasonably achievable. Multiplanar reconstructed images and MIP images for CT angiogram.      Mild reticulation with mild diffuse bilateral ground glass opacities may relate to atelectasis pneumonitis or edema or acute atypical viral illness. Small bilateral hilar lymph nodes are noted indeterminate. There is calcification of the thoracic aorta. The proximal left subclavian artery, brachycephalic artery, proximal right subclavian artery are patent without significant flow-limiting stenosis or occlusion. The left common carotid artery and right common    carotid artery patent without significant flow-limiting stenosis or occlusion. Mild calcification is noted. The internal carotid arteries are patent without significant flow-limiting stenosis or occlusion. The right and left vertebral arteries are patent without significant flow-limiting stenosis or occlusion. The basilar artery is patent without significant flow-limiting stenosis or occlusion. IMPRESSION:      No significant flow-limiting stenosis or occlusion involving the common carotid or internal carotid arteries or vertebral arteries.       Reticulation with groundglass opacities involving the visualized lungs may relate to atelectasis pneumonitis or edema. Mildly enlarged visualized bilateral hilar lymph nodes may be inflammatory, infectious or neoplastic. CT HEAD WO CONTRAST   Final Result      No acute hemorrhage. MRI brain without contrast    (Results Pending)       Assessment/Plan:    Active Hospital Problems    Diagnosis Date Noted    Ischemic stroke ([address] [I63.9] 03/19/2021   HTN  ? Dysphagia  Sinus tachycardia     PLAN:     ASA, statin  Permissive HTN for first 24 hours - completed 24 hours  Labs: Lipids, Hgb A1c  CT head, CTA head with no acute findings  MRI brain pending  Echocardiogram with bubble  Telemetry   Neurology consulted, appreciate recs  PT/OT  SLP evaluation in process  Gentle IV fluids  Monitor blood pressure    HTN  Start low dose lisinopril    DVT Prophylaxis: SCDs  Diet: DIET GENERAL;  Code Status: Full Code    PT/OT Eval Status: in process    Dispo - Inpatient    Amaya Lanier DO

## 2021-03-20 NOTE — PROGRESS NOTES
Patient is A/O x4, VSS, and denies pain at this time. Patient passed bedside swallow screen twice, patient's diet advanced to general diet. Patient ambulating to the bathroom to void with x1 SBA and tolerates activity well. Patient's NIHSS - 2, with left side facial droop and slurred speech. Fall precautions remain in place, will continue to monitor and assess patient.

## 2021-03-20 NOTE — PROGRESS NOTES
Neurology Progress Note - 03/20/21        Patient seen and examined. She is sitting up in chair eating lunch. She feels speech has improved. She is using her left hand to eat. No new complaints. MRI pending. Echo done but results pending. Review of systems: Other than any additions above, unchanged since consult note /2019. Past Medical History:   Diagnosis Date    Rectal prolapse        History reviewed. No pertinent surgical history. Patient  reports that she has never smoked. She has never used smokeless tobacco. She reports that she does not drink alcohol or use drugs. Patient's family history is not on file. Patient has No Known Allergies. Current Facility-Administered Medications:     atorvastatin (LIPITOR) tablet 80 mg, 80 mg, Oral, Nightly, Amaya Black, DO    sodium chloride flush 0.9 % injection 10 mL, 10 mL, Intravenous, 2 times per day, Amaya Black, DO, 10 mL at 03/20/21 1107    sodium chloride flush 0.9 % injection 10 mL, 10 mL, Intravenous, PRN, Amaya Black, DO    promethazine (PHENERGAN) tablet 12.5 mg, 12.5 mg, Oral, Q6H PRN **OR** ondansetron (ZOFRAN) injection 4 mg, 4 mg, Intravenous, Q6H PRN, Amaya Black, DO    polyethylene glycol (GLYCOLAX) packet 17 g, 17 g, Oral, Daily PRN, Amaya Black, DO    aspirin EC tablet 81 mg, 81 mg, Oral, Daily, 81 mg at 03/20/21 1106 **OR** aspirin suppository 300 mg, 300 mg, Rectal, Daily, Amaya Black, DO    0.9 % sodium chloride infusion, , Intravenous, Continuous, Amaya Black, DO, Last Rate: 100 mL/hr at 03/19/21 1836, New Bag at 03/19/21 1836    labetalol (NORMODYNE;TRANDATE) injection 10 mg, 10 mg, Intravenous, Q10 Min PRN, Amaya Black, DO    ------------------------------------------    Exam:    Vitals:  BP (!) 178/81   Pulse 78   Temp 98.2 °F (36.8 °C) (Oral)   Resp 16   Ht 5' (1.524 m)   Wt 243 lb 0.5 oz (110.2 kg)   SpO2 95%   BMI 47.46 kg/m²     Awake, alert, oriented x3. PERRL. EOMI.  Mild left face weakness is better than yesterday  Speech still a bit slurred. False teeth are loose in her mouth though. Denies dysphagia. Moves all 4 extremities. No left pronator drift today. Touch intact in all 4 ext. Recent Results (from the past 24 hour(s))   Troponin    Collection Time: 03/19/21  1:52 PM   Result Value Ref Range    Troponin <0.01 <0.01 ng/mL   CBC    Collection Time: 03/20/21  6:14 AM   Result Value Ref Range    WBC 7.6 4.0 - 11.0 K/uL    RBC 4.64 4.00 - 5.20 M/uL    Hemoglobin 13.1 12.0 - 16.0 g/dL    Hematocrit 40.6 36.0 - 48.0 %    MCV 87.5 80.0 - 100.0 fL    MCH 28.3 26.0 - 34.0 pg    MCHC 32.3 31.0 - 36.0 g/dL    RDW 15.4 12.4 - 15.4 %    Platelets 727 069 - 373 K/uL    MPV 8.5 5.0 - 10.5 fL   Renal function panel    Collection Time: 03/20/21  6:14 AM   Result Value Ref Range    Sodium 142 136 - 145 mmol/L    Potassium 4.0 3.5 - 5.1 mmol/L    Chloride 106 99 - 110 mmol/L    CO2 29 21 - 32 mmol/L    Anion Gap 7 3 - 16    Glucose 111 (H) 70 - 99 mg/dL    BUN 10 7 - 20 mg/dL    CREATININE <0.5 (L) 0.6 - 1.2 mg/dL    GFR Non-African American >60 >60    GFR African American >60 >60    Calcium 9.0 8.3 - 10.6 mg/dL    Phosphorus 3.5 2.5 - 4.9 mg/dL    Albumin 4.0 3.4 - 5.0 g/dL       --    ----------------------------------------      Impression:  Clinically with right subcortical stroke. Plan:  Aspirin and statin started  MRI and echo pending. BP correction slowly over the next few days. Will follow for results. --  Thank you for allowing me to participate in the care of your patient. If I can be of any further assistance, please do not hesitate to contact me.     Electronically signed by Elodia Campuzano MD on 3/20/2021 at 12:52 PM    [facility]

## 2021-03-20 NOTE — PROGRESS NOTES
Physical Therapy    Facility/Department: [facility] 5T ORTHO/NEURO  Initial Assessment/Treatment    NAME: Jeannie Boswell  : 1955  MRN: [id number]    Date of Service: 3/20/2021    Discharge Recommendations: Jeannie Boswell scored a 20/24 on the AM-PAC short mobility form. Current research shows that an AM-PAC score of 18 or greater is typically associated with a discharge to the patient's home setting. If patient discharges prior to next session this note will serve as a discharge summary. Please see below for the latest assessment towards goals. PT Equipment Recommendations  Equipment Needed: No    Assessment   Body structures, Functions, Activity limitations: Decreased strength  Assessment: 61 yo admitted with slurred speech. Demo slight weakness L hip & slight impulsivity with activity. Appears close to her baseline level for transfers/ambulation. Was able to ambulate in aquino without need for device. Cues to mobilize slowly on stairs & take steps 1 at a time. Anticipate home at dc with A from family prn. Encouraged to have daughter do her laundry initially at home. Will follow while here. Treatment Diagnosis: L hip weakness  Prognosis: Good  Decision Making: Medium Complexity  PT Education: Goals;PT Role;Plan of Care;Gait Training;Transfer Training  Patient Education: verbalized understanding  REQUIRES PT FOLLOW UP: Yes  Activity Tolerance  Activity Tolerance: Patient Tolerated treatment well       Patient Diagnosis(es): The primary encounter diagnosis was Aphasia. A diagnosis of Facial droop was also pertinent to this visit. has a past medical history of Rectal prolapse.   has no past surgical history on file. Restrictions  Position Activity Restriction  Other position/activity restrictions: up with A prn  Vision/Hearing  Vision: Within Functional Limits  Hearing: Within functional limits     Subjective  General  Chart Reviewed:  Yes  Additional Pertinent Hx: Adm 3/19 with slurred speech. Head CT (-). CTA head/neck:No significant flow-limiting stenosis or occlusion involving the anterior or posterior circulation. Family / Caregiver Present: No  Referring Practitioner: Trung Koenig DO  Diagnosis: ischemic stroke  Follows Commands: Within Functional Limits  Subjective  Subjective: Found in chair, agreeable to PT. Reports feeling very stiff from not moving. Pain Screening  Patient Currently in Pain: Denies  Vital Signs  Patient Currently in Pain: Denies       Orientation  Orientation  Overall Orientation Status: Within Normal Limits  Social/Functional History  Social/Functional History  Lives With: Family(dtr & granddaughter & ex  live with her)  Type of Home: House  Home Layout: Two level, Laundry in basement(bi level)  Home Access: Stairs to enter with rails  Entrance Stairs - Number of Steps: 3 platform steps; 8-9 to main floor with rail  Bathroom Shower/Tub: Walk-in shower  Bathroom Toilet: Standard(sink next to)  Home Equipment: (no DME)  ADL Assistance: Independent  Homemaking Assistance: Independent  Ambulation Assistance: Independent  Transfer Assistance: Independent  Active : Yes  Occupation: Full time employment  Type of occupation: /cook at [employer]  Additional Comments: 1 month ago- tripped on edging in yard & fell while walking dog.   Ex- home all day & watches the granddaughter         Objective          AROM RLE (degrees)  RLE AROM: WNL  AROM LLE (degrees)  LLE AROM : WNL  Strength RLE  Strength RLE: WNL  Strength LLE  Comment: hip add 4/5, hip flex 4+     Sensation  Overall Sensation Status: WNL  Bed mobility  Supine to Sit: Independent(puts 1 knee up on bed & rolls herself in (has waterbed at home))  Sit to Supine: Independent(rolls over onto stomach & puts feet on floor to get up)  Transfers  Sit to Stand: Stand by assistance  Stand to sit: Stand by assistance  Ambulation  Ambulation?: Yes  Ambulation 1  Surface: level tile  Device: No Device Assistance: Stand by assistance  Quality of Gait: steady gait without LOB; does demo slight lateral lean B with each step which pt states is her normal  Distance: 120', 10' x 2  Stairs/Curb  Stairs?: Yes(up/down 6 steps with 1 rail, CGA. Encouraged pt to slow down & take steps 1 at a time.)     Balance  Sitting - Static: Good(independent)  Sitting - Dynamic: Good(SBA)  Standing - Static: Good(SBA)  Standing - Dynamic: Good(SBA with gait)  Comments: Pt ambulated around bed few times while making bed SBA/S without LOB. Treatment included gait/transfer training, pt education.       Plan   Plan  Times per week: 5-7  Current Treatment Recommendations: Balance Training, Functional Mobility Training, Transfer Training, Gait Training, Stair training, Strengthening  Safety Devices  Type of devices: Call light within reach, Chair alarm in place, Nurse notified, Left in chair                                                      AM-PAC Score  AM-PAC Inpatient Mobility Raw Score : 20 (03/20/21 1259)  AM-PAC Inpatient T-Scale Score : 47.67 (03/20/21 1259)  Mobility Inpatient CMS 0-100% Score: 35.83 (03/20/21 1259)  Mobility Inpatient CMS G-Code Modifier : Scott Boone (03/20/21 1259)          Goals  Short term goals  Time Frame for Short term goals: dc  Short term goal 1: Transfers S  Short term goal 2: Ambulate 150' S without LOB  Short term goal 3: up/down 8 steps with 1 rail SBA  Short term goal 4: 10 reps LLE ex independent  Patient Goals   Patient goals : home at [place]       Therapy Time   Individual Concurrent Group Co-treatment   Time In 1120         Time Out 1202         Minutes 42           Timed Code Treatment Minutes:   30    Total Treatment Minutes:  [address] [facility], [address] [facility]

## 2021-03-21 LAB
ALBUMIN SERPL-MCNC: 3.9 G/DL (ref 3.4–5)
ANION GAP SERPL CALCULATED.3IONS-SCNC: 7 MMOL/L (ref 3–16)
BUN BLDV-MCNC: 12 MG/DL (ref 7–20)
CALCIUM SERPL-MCNC: 9 MG/DL (ref 8.3–10.6)
CHLORIDE BLD-SCNC: 105 MMOL/L (ref 99–110)
CO2: 29 MMOL/L (ref 21–32)
CREAT SERPL-MCNC: <0.5 MG/DL (ref 0.6–1.2)
ESTIMATED AVERAGE GLUCOSE: 114 MG/DL
GFR AFRICAN AMERICAN: >60
GFR NON-AFRICAN AMERICAN: >60
GLUCOSE BLD-MCNC: 105 MG/DL (ref 70–99)
HBA1C MFR BLD: 5.6 %
PHOSPHORUS: 3.5 MG/DL (ref 2.5–4.9)
POTASSIUM SERPL-SCNC: 4.1 MMOL/L (ref 3.5–5.1)
SODIUM BLD-SCNC: 141 MMOL/L (ref 136–145)

## 2021-03-21 PROCEDURE — 2060000000 HC ICU INTERMEDIATE R&B

## 2021-03-21 PROCEDURE — 92523 SPEECH SOUND LANG COMPREHEN: CPT

## 2021-03-21 PROCEDURE — 2580000003 HC RX 258: Performed by: INTERNAL MEDICINE

## 2021-03-21 PROCEDURE — 6370000000 HC RX 637 (ALT 250 FOR IP): Performed by: INTERNAL MEDICINE

## 2021-03-21 PROCEDURE — 36415 COLL VENOUS BLD VENIPUNCTURE: CPT

## 2021-03-21 PROCEDURE — 92526 ORAL FUNCTION THERAPY: CPT

## 2021-03-21 PROCEDURE — 80069 RENAL FUNCTION PANEL: CPT

## 2021-03-21 RX ADMIN — ATORVASTATIN CALCIUM 80 MG: 80 TABLET, FILM COATED ORAL at 20:25

## 2021-03-21 RX ADMIN — Medication 10 ML: at 10:17

## 2021-03-21 RX ADMIN — LISINOPRIL 5 MG: 5 TABLET ORAL at 09:21

## 2021-03-21 RX ADMIN — ASPIRIN 81 MG: 81 TABLET, COATED ORAL at 09:21

## 2021-03-21 RX ADMIN — Medication 10 ML: at 20:26

## 2021-03-21 ASSESSMENT — PAIN SCALES - GENERAL: PAINLEVEL_OUTOF10: 0

## 2021-03-21 NOTE — PLAN OF CARE
Problem: HEMODYNAMIC STATUS  Goal: Patient has stable vital signs and fluid balance  3/21/2021 0737 by Zora Gibson RN  Outcome: Ongoing  3/20/2021 2259 by Aminata Geronimo RN  Outcome: Ongoing     Problem: Falls - Risk of:  Goal: Will remain free from falls  Description: Will remain free from falls  3/21/2021 0737 by Zora Gibson RN  Outcome: Ongoing  3/20/2021 2259 by Aminata Geronimo RN  Outcome: Ongoing

## 2021-03-21 NOTE — PROGRESS NOTES
Patient is AAOX4. VSS. Patient is up SBA. Patient is voiding adequately. Patient is resting in the chair at this time with all fall precautions in place.  Will continue to monitor

## 2021-03-21 NOTE — PROGRESS NOTES
Patient currently resting in chair at bedside. Alert and oriented x 4. Patient in no acute distress. Chair alarm on and fall precautions taken. Nurse will continue to monitor/reassess. Call light within reach.  Nidhi Leonard Universal Safety Interventions

## 2021-03-21 NOTE — PLAN OF CARE
SLP evaluation completed. Please refer to EMR.     Louis Price MA CCC-SLP; [id number]  Speech-Language Pathologist

## 2021-03-21 NOTE — PROGRESS NOTES
Hospitalist Progress Note      PCP: Referring Not In System (Inactive)    Date of Admission: 3/19/2021    Chief Complaint: Slurred speech, facial droop on left    Subjective:   No new changes since yesterday. No new numbness or weakness. Is on a dysphagia diet. Medications:  Reviewed    Infusion Medications     Scheduled Medications    atorvastatin  80 mg Oral Nightly    lisinopril  5 mg Oral Daily    sodium chloride flush  10 mL Intravenous 2 times per day    aspirin  81 mg Oral Daily    Or    aspirin  300 mg Rectal Daily     PRN Meds: sodium chloride flush, promethazine **OR** ondansetron, polyethylene glycol, labetalol      Intake/Output Summary (Last 24 hours) at 3/21/2021 1136  Last data filed at 3/20/2021 2252  Gross per 24 hour   Intake 180 ml   Output    Net 180 ml       Exam:    /78   Pulse 79   Temp 97.8 °F (36.6 °C) (Oral)   Resp 18   Ht 5' (1.524 m)   Wt 243 lb 0.5 oz (110.2 kg)   SpO2 92%   BMI 47.46 kg/m²     General appearance: No apparent distress, appears stated age and cooperative. HEENT: Pupils equal, round, and reactive to light. Conjunctivae/corneas clear. Neck: Supple, with full range of motion. No jugular venous distention. Trachea midline. Respiratory:  Normal respiratory effort. Clear to auscultation, bilaterally without Rales/Wheezes/Rhonchi. Cardiovascular: Regular rate and rhythm with normal S1/S2 without murmurs, rubs or gallops. Abdomen: Soft, non-tender, non-distended with normal bowel sounds. Musculoskelatal: No clubbing, cyanosis or edema bilaterally. Skin: Skin color, texture, turgor normal.  No rashes or lesions. Neurologic:  Cranial nerves: II-XII intact, grossly non-focal. Mild dysarthria.   Psychiatric: Alert and oriented, thought content appropriate, normal insight    Labs:   Recent Labs     03/19/21  1222 03/20/21  0614   WBC 7.4 7.6   HGB 13.2 13.1   HCT 40.7 40.6    219     Recent Labs     03/19/21  1222 03/20/21  0614 03/21/21 0509   * 142 141   K 3.7 4.0 4.1   CL 99 106 105   CO2 27 29 29   BUN 9 10 12   CREATININE 0.5* <0.5* <0.5*   CALCIUM 8.7 9.0 9.0   PHOS  --  3.5 3.5     No results for input(s): AST, ALT, BILIDIR, BILITOT, ALKPHOS in the last 72 hours. Recent Labs     03/19/21  1222   INR 1.09     Recent Labs     03/19/21  1352   TROPONINI <0.01       Studies:  MRI brain without contrast   Final Result      1. Acute bland right basal ganglia infarct. 2.  Moderate chronic small vessel ischemic disease. 3.  Chronic microhemorrhage in the right aspect of the corky. CTA HEAD NECK W CONTRAST   Final Result      No significant flow-limiting stenosis or occlusion involving the anterior or posterior circulation      CTA NECK:   Dose modulation, iterative reconstruction and/or weight based adjustments of the mA/kV were utilized to reduce radiation dose to as low as reasonably achievable. Multiplanar reconstructed images and MIP images for CT angiogram.      Mild reticulation with mild diffuse bilateral ground glass opacities may relate to atelectasis pneumonitis or edema or acute atypical viral illness. Small bilateral hilar lymph nodes are noted indeterminate. There is calcification of the thoracic aorta. The proximal left subclavian artery, brachycephalic artery, proximal right subclavian artery are patent without significant flow-limiting stenosis or occlusion. The left common carotid artery and right common    carotid artery patent without significant flow-limiting stenosis or occlusion. Mild calcification is noted. The internal carotid arteries are patent without significant flow-limiting stenosis or occlusion. The right and left vertebral arteries are patent without significant flow-limiting stenosis or occlusion. The basilar artery is patent without significant flow-limiting stenosis or occlusion.       IMPRESSION:      No significant flow-limiting stenosis or occlusion involving the common carotid or internal carotid arteries or vertebral arteries. Reticulation with groundglass opacities involving the visualized lungs may relate to atelectasis pneumonitis or edema. Mildly enlarged visualized bilateral hilar lymph nodes may be inflammatory, infectious or neoplastic. CT HEAD WO CONTRAST   Final Result      No acute hemorrhage. Assessment/Plan:    Active Hospital Problems    Diagnosis Date Noted    Ischemic stroke ([facility] [address] [I63.9] 03/19/2021   HTN  ? Dysphagia  Sinus tachycardia     PLAN:     Permissive HTN for first 24 hours - completed   Labs: Lipids - LDL 76, Hgb A1c 5.6  CT head, CTA head with no acute findings  MRI brain: acute Right basal gangli infarct, moderate chronic small vessel ischemic disease. Chronic micro hemorrhage/hemosiderin deposition in the right paramedian corky. Has been on ASA, statin  Echocardiogram with bubble: EF 50%, grade II diastolic dysfunction, left atrium is normal in size. No significant valve abnormalities. No shunt. Telemetry - sinus rhythm thus far  Neurology consulted, appreciate recs  PT/OT  SLP continuing to evaluate  Recommend ENT evaluation for dysphonia history which patient had at baseline however worsened since CVA    HTN  Started low dose lisinopril  Monitor blood pressures    DVT Prophylaxis: SCDs  Diet: DIET DYSPHAGIA MINCED AND MOIST; Moderately Thick (Honey)  Code Status: Full Code    PT/OT Eval Status: PT 20/24  Dispo - Inpatient, likely dc tomorrow to home with heart monitor, neuro recs, ENT outpatient follow-up.     Amaya Lanier DO

## 2021-03-21 NOTE — PROGRESS NOTES
Speech Language Pathology  Facility/Department: [facility] 5T ORTHO/NEURO  Dysphagia Daily Treatment Note    NAME: Soha Kennedy  : 1955  MRN: [id number]    Patient Diagnosis(es):   Patient Active Problem List    Diagnosis Date Noted    Ischemic stroke ([facility] [address] 2021     Allergies: No Known Allergies            Previous MBS  None    Chart reviewed. Medical Diagnosis: CVA  Treatment Diagnosis: dysphagia    BSE Impression (3/20/21)  Dysphagia Diagnosis: Suspected needs further assessment;Mild to moderate oral stage dysphagia;Mild to moderate pharyngeal stage dysphagia  Dysphagia Impression : Patient presents with suspected mild-moderate oropharyngeal dysphagia, needs further assessment. Left sided facial asymmetry noted during oral motor exam. Voice is dysphonic (strained, pitch breaks, breathy); baseline per pt, as is coughing with PO intake (reports started when she was 4-4 yo, and had scarlet fever). Denies h/o pneumonia. Never seen by ENT. Assessed tolerance ice chips, thins via tsp/cup/straw, nectar thick via cup, honey thick via cup, puree, soft solid, and regular solids. Patient with positive oral acceptance, prolonged mastication, positive swallow onset, mild oral stasis regular solids. Consistent cough with thin liquids, infrequent cough with nectar, and no coughing with honey thick liquids. Tolerated puree/soft solids no s/s aspiration. While pt reports coughing with PO with no recorded h/o pneumonia at baseline, given concern for stroke/new left-sided weakness, recommend instrumental assessment to further evaluate pharyngeal/laryngeal function. D/t staffing, unable to complete this date (weekend); discussed with pt, who would like to continue PO intake. Recommend Dysphagia II Minced & Moist solids / Moderatly Thick (Honey) Liquids / meds in puree; as tolerated. Will continue to follow.     MBS results  To be completed 3/22/21 vs FEES    Pain: Denied    Current Diet :  Dysphagia minced and moist + moderately thick (honey thick) liquids recommend advance to REGULAR + THIN liquids this date    Treatment:  Pt seen bedside to address the following goals:  Goal 1: Patient will tolerate least restrictive diet without overt signs of aspiration or associated decline in respiratory status. 3/21:  No indication of respiratory decline per chart review. RN reported pt disliking thickened liquids from kitchen and requesting thin liquids to thicken herself at bedside. Pt endorsed thickened liquids eased \"tickling\" in throat. Pt perseverative on \"sinuses\" causing problems swallowing for years prior to CVA and thickened liquids easing impact of sinuses on swallowing. Pt with ongoing dysphonia. Analyzed pt with thins via cup edge and straw, thickened liquids via cup edge, and soft solids. Pt consumed 3 oz thins via cup edge uninterrupted w/o immediate or delayed cough or wet voice. Pt with reactive coughing on thins via straw. No other overt s/s associated with aspiration exhibited with any other consistencies trialed. Pt with ill-fitting partial plate and mastication and posterior propulsion of soft solids mildly slowed but functional with eventual complete oral clearance. Discussed diet consistency options with pt as detailed below - pt electing regular + thins at this time. Continue goal.    Goal 2: Patient caregiver will demonstrate understanding of swallowing concerns/recommendations. 3/20: Patient educated at length regarding anatomy and function of swallow/voice mechanism, dysphagia, dysphagia risks/concerns (aspiration, pneumonia, choking), s/s aspiration, instrumental swallowing/voice assessments, diet recommendations/options, thickener purpose/rationale, aspiration of thick vs thin liquids, safety strategies (small single sips, slow rate, upright position, alternate liquids/solids), importance of frequent oral hygiene. Pt stated good understanding.  After discussion, pt decided she would like to try honey thick liquids/minced & moist solids, as she demonstrated improved tolerance with them, and rather than remain NPO waiting for instrumental assessment. Cont goal.  3/21: Pt independently recalled all information from BSE re: thickened liquids, PNA risks, and recommendations. Pt recalled how to utilize thickener and endorsed preference to thicken bedside. Educated pt to results of session and options to continue general diet until instrumental assessment can be completed - pt endorsed ease of swallow with thicker fluids and would like to continue to thicken at bedside. Obtained cups, spoons, and thickener packets for pt. Educated pt to compensatory swallow strategies and recs to avoid straws. Educated pt to FEES vs MBS and pros / cons of each procedure. Educated pt to need for ENT evaluation as well given dysphonia. Pt verbalized comprehension of all information. GOAL MET. Continue goal.      Goal 3: Patient will tolerate instrumental swallow assessment as appropriate. 3/21: Unable to schedule this date. FEES would be preferred given voice dysfunction and c/o sinus drainage impacting swallow; however, may not be able to schedule until 3/23/21. MBS would provide adequate information on aspiration risks and pharyngeal dysfunction and may be able to be completed tomorrow. Patient/Family/Caregiver Education:  As above    Compensatory Strategies:  Upright for all PO intake  Small bites / sips  Alternate solids and liquids  No straws       Plan:  Continued daily Dysphagia treatment with goals per  plan of care. Diet recommendations: regular with thins    ENT evaluation for dysphonia and dysphagia    DC recommendation: ongoing ST indicated  Treatment: 15 min  D/W nursing Francisca  Needs met prior to leaving room, call button in reach. Jules Ceran MA CCC-SLP; [id number]  Speech-Language Pathologist    Pg.  # [phone]    If patient is discharged prior to next treatment, this note will serve as the discharge summary

## 2021-03-21 NOTE — PROGRESS NOTES
Speech Language Pathology  Facility/Department: [facility] 5T ORTHO/NEURO  Initial Speech/Language/Cognitive Assessment    NAME: Rafiq Thomason  : 1955   MRN: [id number]  ADMISSION DATE: 3/19/2021  ADMITTING DIAGNOSIS: has Ischemic stroke ([address] on their problem list.  DATE ONSET: 3/19/21 although pt endorsed h/o dysphonia for years    Date of Eval: 3/21/2021   Evaluating Therapist: GEMA Jean    RECENT RESULTS  CT OF HEAD/MRI: (3/20/21)  1.  Acute bland right basal ganglia infarct. 2.  Moderate chronic small vessel ischemic disease. 3.  Chronic microhemorrhage in the right aspect of the corky. Primary Complaint:  Others reporting voice to be slurred; difficulty speaking w/o voice breaking / running out of air; rated speech / voice severity at 5/10 with 10 = most severe impairment    Pain:  Denied    Assessment:  Cognitive Diagnosis: cognitive linguistic impairment marked by impulsivity, perseverations, and reduced self-monitoring  Speech Diagnosis: dysarthria with dysphonia marked by intermittent imprecise articulation, reduced coordination of speech / breath, and hoarse + strained vocal quality   Diagnosis: Cognitive linguistic impairment, dysarthria + dysphonia. Pt reported impulsivity and excessive verbal output + poor turn taking to be baseline; however, pt also endorsed dtr c/o pt's text messages \"running together,\" indicating possible reduced thought organization and self-monitoring. Recommend ENT consult for dysphonia, which pt reports dysphonia at baseline but worsened s/p CVA. Recommendations:  Requires SLP Intervention: Yes  Duration/Frequency of Treatment: 3-5x/wk for LOS  D/C Recommendations: (ongoing ST indicated)  Referral To: ENT(dysphonia)     Plan:   Goals:  Short-term Goals  Goal 1: Pt will complete sentence level speaking tasks w/o insances of aphonia or perceptual strain with min cues.   Goal 2: Pt will complete graded tasks w/o impulsivity and adequate indicated)  Problem Solving  Problem Solving: Exceptions to [facility]  Managing Finances: To be assessed in therapy  Managing Medications: To be assessed in therapy  Safety/Judgement  Safety/Judgement: Exceptions to [facility]  Unable to Self-monitor and Self-correct Consistently: Moderate(poor self-monitoring of speech and breath support)  Impulsive: Moderate    Additional Assessments:  Voice Evaluation  Vocal Quality: Exceptions to [facility]  Hoarse: Moderate  Dysphonic: Moderate  Vocal Intensity: Mildly decreased  Maximum Phonation Time: average of 8.3 seconds  S/Z Ratio: 0.6         Clock drawing: WFL    Prognosis:  Speech Therapy Prognosis  Prognosis: Fair  Prognosis Considerations: Previous Level of Function  Additional Comments: endorsed baseline deficits  Individuals consulted  Consulted and agree with results and recommendations: Patient;RN    Education:  Patient Education: Educated pt to role of SLP, purpose of visit, impact CVA can have on speech / voice / cognitive linguistic skills, results of session, and recommendations  Patient Education Response: Verbalizes understanding  Safety Devices in place: Yes  Type of devices: All fall risk precautions in place    Therapy Time:   Individual Concurrent Group Co-treatment   Time In 1030 0000 0000 0000   Time Out 1045 0000 0000 0000   Minutes 15 0 0 0   Variance: 0  Timed Code Treatment Minutes: 0 Minutes  Total Treatment Time: [address], [place] CCC-SLP; [id number]  Speech-Language Pathologist  Pager # [phone]  Phone # [phone]  Office # [phone]    If patient is discharged prior to next session, this note will serve as discharge summary.

## 2021-03-22 ENCOUNTER — APPOINTMENT (OUTPATIENT)
Dept: GENERAL RADIOLOGY | Age: 66
DRG: 065 | End: 2021-03-22
Payer: COMMERCIAL

## 2021-03-22 VITALS
BODY MASS INDEX: 47.71 KG/M2 | DIASTOLIC BLOOD PRESSURE: 84 MMHG | SYSTOLIC BLOOD PRESSURE: 124 MMHG | HEIGHT: 60 IN | TEMPERATURE: 98.3 F | OXYGEN SATURATION: 92 % | HEART RATE: 76 BPM | WEIGHT: 243.03 LBS | RESPIRATION RATE: 16 BRPM

## 2021-03-22 LAB
ALBUMIN SERPL-MCNC: 4.1 G/DL (ref 3.4–5)
ANION GAP SERPL CALCULATED.3IONS-SCNC: 10 MMOL/L (ref 3–16)
BUN BLDV-MCNC: 15 MG/DL (ref 7–20)
CALCIUM SERPL-MCNC: 9.2 MG/DL (ref 8.3–10.6)
CHLORIDE BLD-SCNC: 103 MMOL/L (ref 99–110)
CO2: 26 MMOL/L (ref 21–32)
CREAT SERPL-MCNC: <0.5 MG/DL (ref 0.6–1.2)
GFR AFRICAN AMERICAN: >60
GFR NON-AFRICAN AMERICAN: >60
GLUCOSE BLD-MCNC: 96 MG/DL (ref 70–99)
PHOSPHORUS: 3.9 MG/DL (ref 2.5–4.9)
POTASSIUM SERPL-SCNC: 4.1 MMOL/L (ref 3.5–5.1)
SODIUM BLD-SCNC: 139 MMOL/L (ref 136–145)

## 2021-03-22 PROCEDURE — 2580000003 HC RX 258: Performed by: INTERNAL MEDICINE

## 2021-03-22 PROCEDURE — 92526 ORAL FUNCTION THERAPY: CPT

## 2021-03-22 PROCEDURE — 80069 RENAL FUNCTION PANEL: CPT

## 2021-03-22 PROCEDURE — 92611 MOTION FLUOROSCOPY/SWALLOW: CPT

## 2021-03-22 PROCEDURE — 74230 X-RAY XM SWLNG FUNCJ C+: CPT

## 2021-03-22 PROCEDURE — 36415 COLL VENOUS BLD VENIPUNCTURE: CPT

## 2021-03-22 PROCEDURE — 99233 SBSQ HOSP IP/OBS HIGH 50: CPT | Performed by: PSYCHIATRY & NEUROLOGY

## 2021-03-22 PROCEDURE — 6370000000 HC RX 637 (ALT 250 FOR IP): Performed by: INTERNAL MEDICINE

## 2021-03-22 RX ORDER — ASPIRIN 81 MG/1
81 TABLET ORAL DAILY
Qty: 30 TABLET | Refills: 3 | Status: SHIPPED | OUTPATIENT
Start: 2021-03-22

## 2021-03-22 RX ORDER — ATORVASTATIN CALCIUM 80 MG/1
80 TABLET, FILM COATED ORAL DAILY
Qty: 30 TABLET | Refills: 3 | Status: SHIPPED | OUTPATIENT
Start: 2021-03-22

## 2021-03-22 RX ORDER — LISINOPRIL 10 MG/1
10 TABLET ORAL DAILY
Qty: 30 TABLET | Refills: 0 | Status: SHIPPED | OUTPATIENT
Start: 2021-03-23

## 2021-03-22 RX ADMIN — ASPIRIN 81 MG: 81 TABLET, COATED ORAL at 08:13

## 2021-03-22 RX ADMIN — LISINOPRIL 5 MG: 5 TABLET ORAL at 08:13

## 2021-03-22 RX ADMIN — Medication 10 ML: at 08:14

## 2021-03-22 NOTE — PROGRESS NOTES
Physical/Occupational Therapy  Attempt  Therapy was attempted this PM with pt off the floor for MBS. Physical/Occupational therapy will be attempted at a later time versus a later date as appropriate and as schedule permits.      Luciana Shankar, PT, DPT     Garland [place], OTR/L, [id number]

## 2021-03-22 NOTE — PROGRESS NOTES
Physician Progress Note      Henok Kan  CSN #:                  [id number]  :                       1955  ADMIT DATE:       3/19/2021 12:00 PM  [facility] DATE:  RESPONDING  PROVIDER #:        Jermaine Shelton MD          QUERY TEXT:    Dear Provider,    Patient admitted with acute ischemic stroke, noted to have BMI 47.46. If   possible, please document in progress notes and discharge summary if you are   evaluating and/or treating any of the following:     The medical record reflects the following:  Risk Factors:Hx morbid obesity  Clinical Indicators: Ht: 5',  Wgt : 243 lbs  0.5 oz,  BMI: 47.46  Treatment: Cardiac diet  Options provided:  -- Morbid obesity with BMI 47.46  -- Other - I will add my own diagnosis  -- Disagree - Not applicable / Not valid  -- Disagree - Clinically unable to determine / Unknown  -- Refer to Clinical Documentation Reviewer    PROVIDER RESPONSE TEXT:    This patient has morbid obesity with BMI 47.46    Query created by: Lynda Causey on 3/22/2021 11:52 AM      Electronically signed by:  Jermaine Shelton MD 3/22/2021 11:58 AM

## 2021-03-22 NOTE — CARE COORDINATION
Case Management Assessment            Discharge Note                    Date / Time of Note: 3/22/2021 12:01 PM                  Discharge Note Completed by: Quinton Macias    Patient Name: Rufino Rodriguez   YOB: 1955  Diagnosis: Ischemic stroke [facility]) [I63.9]   Date / Time: 3/19/2021 12:00 PM    Current PCP: Referring Not In System (Inactive)  Clinic patient: No    Hospitalization in the last 30 days: No    Advance Directives:  Code Status: Full Code  [place] DNR form completed and on chart: Not Indicated    Financial:  Payor: Liv Polanco / Plan: Reyna Boyd / Product Type: *No Product type* /      Pharmacy:    [facility] #[id number] - [address], [facility] [phone] - f [phone]  [address] [address]  Phone: [phone] Fax: [phone]      Assistance purchasing medications?: Potential Assistance Purchasing Medications: No  Assistance provided by Case Management: None at this time    Does patient want to participate in local refill/ meds to beds program?: No    Meds To Beds General Rules:  1. Can ONLY be done Monday- Friday between 8:30am-5pm  2. Prescription(s) must be in pharmacy by 3pm to be filled same day  3. Copy of patient's insurance/ prescription drug card and patient face sheet must be sent along with the prescription(s)  4. Cost of Rx cannot be added to hospital bill. If financial assistance is needed, please contact unit  or ;  or  CANNOT provide pharmacy voucher for patients co-pays  5.  Patients can then  the prescription on their way out of the hospital at discharge, or pharmacy can deliver to the bedside if staff is available. (payment due at time of pick-up or delivery - cash, check, or card accepted)     Able to afford home medications/ co-pay costs: Yes    ADLS:  Current PT AM-PAC Score: 20 /24  Current OT AM-PAC Score: 21 /24      DISCHARGE Disposition: Home- No Services Needed    LOC at discharge: Not Applicable  ELIZABETH Completed: Yes    Notification completed in HENS/PAS?:  Not Applicable    IMM Completed:   Not Indicated    Transportation:  Transportation PLAN for discharge: family   Mode of Transport: Private Car  Reason for medical transport: Not Applicable  Name of [facility]: Not Applicable  Time of Transport: when available    Transport form completed: Not Indicated    Home Care:  [address] ordered at discharge: Not [address]: Not Applicable  Orders faxed: No    Durable Medical Equipment:  DME Provider:   Equipment obtained during hospitalization:     Home Oxygen and Respiratory Equipment:  Oxygen needed at discharge?: Not [address]: Not Applicable  Portable tank available for discharge?: Not Indicated    Dialysis:  Dialysis patient: No    Dialysis Center:  Not Applicable    Hospice Services:  Location: Not Applicable  Agency: Not Applicable    Consents signed: Not Indicated    Referrals made at [facility] for outpatient continued care:  Not Applicable    Additional CM Notes:   CM spoke to patient at bedside to review discharge plans. Patient here from home. Daughter lives with her. Will return to home with daughter transporting. Patient declining any services at discharge. Does not feel they would be necessary. Patient verbalized understanding of discharge plans and denied any questions or concerns. The Plan for Transition of Care is related to the following treatment goals of Ischemic stroke [facility]) [I63.9]    The Patient and/or patient representative Armando Cohen and her family were provided with a choice of provider and agrees with the discharge plan Yes    Freedom of choice list was provided with basic dialogue that supports the patient's individualized plan of care/goals and shares the quality data associated with the providers.  Yes    Care Transitions patient: No    Jordy Goldstein RN  [facility] [employer]  Case Management Department  Ph: [phone]  Fax: [phone]

## 2021-03-22 NOTE — PROCEDURES
INSTRUMENTAL SWALLOW REPORT  MODIFIED BARIUM SWALLOW/treatment     NAME: Kylee Gooden   : 1955  MRN: [id number]       Date of Eval: 3/22/2021     Ordering Physician: Dr. Gabriella Kern  Radiologist: Dr. Ricardo Rodriguez     Referring Diagnosis(es): Referring Diagnosis: ischemic stroke    Past Medical History:  has a past medical history of Rectal prolapse. Past Surgical History:  has no past surgical history on file. Current Diet Solid Consistency: Regular  Current Diet Liquid Consistency: Thin  Type of Study: Initial MBS     Patient Complaints/Reason for Referral:  Kylee Gooden was referred for a MBS to assess the efficiency of his/her swallow function, assess for aspiration, and to make recommendations regarding safe dietary consistencies, effective compensatory strategies, and safe eating environment. Onset of problem:   Date of Onset: 3/19/21    Behavior/Cognition/Vision/Hearing:  Behavior/Cognition: Alert; Cooperative;Pleasant mood  Vision: Within Functional Limits  Hearing: Within functional limits    Impressions:  Pt consumed puree/nectar and thin liquids via cup edge with no penetration or aspiration. Initial tsp of Thin liquids via tsp resulted in mod penetration with cough. Premature bolus loss resulted in mod penetration w/thin liquid via straw and deep penetration when using as liquid wash, only with use of straw. Penetration did not fully clear spontaneously, no aspiration was identified, though pt exhibited cough reflex. There was no pharyngeal residue. Pt demonstrated adequate mastication and good clearance of oral cavity. Treatment Dx and ICD 10: Dysphagia   Patient Position: Lateral and Patient Degrees: 90  Consistencies Administered: Reg solid; Dysphagia Pureed (Dysphagia I); Thin straw; Thin teaspoon; Thin cup;Nectar cup     Dysphagia Outcome Severity Scale: Level 5: Mild dysphagia- Distant supervision.  May need one diet consistency restricted  Penetration-Aspiration Scale (PAS): 5 - Material enters the airway, contacts the vocal folds, and is not ejected into the airway(only with use of straw)    Recommended Diet:  Solid consistency: Regular  Liquid consistency: Thin(no straw)  Liquid administration via: Cup  Medication administration: (as king)    Safe Swallow Protocol:  Upright as possible for all oral intake  NO STRAWS    Recommendations/Treatment  Requires SLP Intervention: Yes  D/C Recommendations: (follow up with ENT for dysphonia and SLP for cognition)     Recommended Exercises: n/a   Therapeutic Interventions: Oral care; Patient/Family education    Education: Images and recommendations were reviewed with pt following this exam.   Patient Education: pt educated to results of MBS  Patient Education Response: Verbalizes understanding    Prognosis  Prognosis for safe diet advancement: fair  Duration/Frequency of Treatment  Duration/Frequency of Treatment: pt to be dc this date- recommend follow up by ENT for dysphonia    Goals:    Goal 1: Patient will tolerate least restrictive diet without overt signs of aspiration or associated decline in respiratory status. 3/21:  No indication of respiratory decline per chart review. RN reported pt disliking thickened liquids from kitchen and requesting thin liquids to thicken herself at bedside. Pt endorsed thickened liquids eased \"tickling\" in throat. Pt perseverative on \"sinuses\" causing problems swallowing for years prior to CVA and thickened liquids easing impact of sinuses on swallowing. Pt with ongoing dysphonia. Analyzed pt with thins via cup edge and straw, thickened liquids via cup edge, and soft solids. Pt consumed 3 oz thins via cup edge uninterrupted w/o immediate or delayed cough or wet voice. Pt with reactive coughing on thins via straw. No other overt s/s associated with aspiration exhibited with any other consistencies trialed.  Pt with ill-fitting partial plate and mastication and posterior propulsion of soft solids mildly slowed but functional with eventual complete oral clearance. Discussed diet consistency options with pt as detailed below - pt electing regular + thins at this time. Continue goal.     Goal 2: Patient caregiver will demonstrate understanding of swallowing concerns/recommendations. 3/20: Patient educated at length regarding anatomy and function of swallow/voice mechanism, dysphagia, dysphagia risks/concerns (aspiration, pneumonia, choking), s/s aspiration, instrumental swallowing/voice assessments, diet recommendations/options, thickener purpose/rationale, aspiration of thick vs thin liquids, safety strategies (small single sips, slow rate, upright position, alternate liquids/solids), importance of frequent oral hygiene. Pt stated good understanding. After discussion, pt decided she would like to try honey thick liquids/minced & moist solids, as she demonstrated improved tolerance with them, and rather than remain NPO waiting for instrumental assessment. Cont goal  3/21: Pt independently recalled all information from BSE re: thickened liquids, PNA risks, and recommendations. Pt recalled how to utilize thickener and endorsed preference to thicken bedside. Educated pt to results of session and options to continue general diet until instrumental assessment can be completed - pt endorsed ease of swallow with thicker fluids and would like to continue to thicken at bedside. Obtained cups, spoons, and thickener packets for pt. Educated pt to compensatory swallow strategies and recs to avoid straws. Educated pt to FEES vs MBS and pros / cons of each procedure. Educated pt to need for ENT evaluation as well given dysphonia. Pt verbalized comprehension of all information. GOAL MET. Continue goal.  3/22: pt educated to results of MBS, diet recommendations and strategies for safer swallowing recommendation for ongoing speech/cog and ENT for dysphonia.   Pt was able to restate recommendations accurately  Goal met     Goal 3: Patient will tolerate instrumental swallow assessment as appropriate. 3/21: Unable to schedule this date. FEES would be preferred given voice dysfunction and c/o sinus drainage impacting swallow; however, may not be able to schedule until 3/23/21. MBS would provide adequate information on aspiration risks and pharyngeal dysfunction and may be able to be completed tomorrow. 3/22: goal met    Oral Preparation / Oral Phase  WFL    Pharyngeal Phase  Pt consumed puree/nectar and thin liquids via cup edge with no penetration or aspiration. Initial tsp of Thin liquids via tsp resulted in mod penetration with cough. Premature bolus loss resulted in mod penetration w/thin liquid via straw and deep penetration when using as liquid wash, only with use of straw. Penetration did not fully clear spontaneously, no aspiration was identified, though pt exhibited cough reflex. There was no pharyngeal residue. Esophageal Phase  Not able to fully view due to shoulder obscuring view    Pain   Patient Currently in Pain: No    Therapy Time:   Individual Concurrent Group Co-treatment   Time In 0145         Time Out 0210         Minutes 25            Plan:  Diet recommendation: regular with thin liquids - NO STRAW  DC Recommendation: ENT consult and sp/cog as out pt   Sheila Iglesias M.S./[facility]-SLP #[id number]  Pg.  # [id number]  Needs met prior to leaving dept   3/22/2021, 2:29 PM

## 2021-03-22 NOTE — PROGRESS NOTES
Speech Language Pathology  Contact note      Not able to complete MBS this date, due to radiology equipment malfunction, awaiting service. Recommend cont regular diet/thin liquids with pt thickening liquids on her own as needed. Will follow up with MBS or FEES as able to be scheduled. If pt dc prior, FEES/ENT could be completed as out pt. D/w HAYLEY Queen M.S./[facility]-SLP #[id number]  Pg. # [id number]      Addendum:  Equipment repaired, MBS to be completed this date. Full  Note to follow  Geovanni Queen M.S./[facility]-SLP #[id number]  Pg.  # [phone] 12:57 PM

## 2021-03-22 NOTE — PROGRESS NOTES
Patient is AAOx4. VSS. Patient is up x1 SBA. Patient is voiding adequately. Patient is resting in the chair at this time with all fall precautions in place. Will continue to monitor.

## 2021-03-22 NOTE — DISCHARGE SUMMARY
Hospital Medicine Discharge Summary    Patient ID: Stephanie Guerrero      Patient's PCP: Referring Not In System (Inactive)    Admit Date: 3/19/2021     Discharge Date:   3/22/2021    Admitting Physician: Rose Hernandez DO     Discharge Physician: Aashish Hilario MD     Discharge Diagnoses: Active Hospital Problems    Diagnosis    Ischemic stroke [facility]) [I63.9]     The patient was seen and examined on day of discharge and this discharge summary is in conjunction with any daily progress note from day of discharge. Hospital Course:    61 yo female presented w/ left facial droop and slurred speech admitted for possible cva. Permissive HTN for first 24 hours - completed   Labs: Lipids - LDL 76, Hgb A1c 5.6  CT head, CTA head with no acute findings  MRI brain: acute Right basal gangli infarct, moderate chronic small vessel ischemic disease. Chronic micro hemorrhage/hemosiderin deposition in the right paramedian corky. Has been on ASA, statin    Echocardiogram with bubble: EF 50%, grade II diastoic dysfunction, left atrium is normal in size. No significant valve abnormalities. No shunt. Telemetry - sinus rhythm thus far  Neurology consulted, appreciate recs     PT/OT- no need    SLP  Recommend ENT evaluation for dysphonia history which patient had at baseline however worsened since CVA- referral for outpt f/u. Physical Exam Performed:     BP (!) 148/89   Pulse 88   Temp 98.1 °F (36.7 °C) (Oral)   Resp 16   Ht 5' (1.524 m)   Wt 243 lb 0.5 oz (110.2 kg)   SpO2 95%   BMI 47.46 kg/m²       General appearance:  No apparent distress, appears stated age and cooperative. HEENT:  Normal cephalic, atraumatic without obvious deformity. Neck: Supple, with full range of motion. No jugular venous distention. Trachea midline. Respiratory:  Normal respiratory effort. Clear to auscultation, bilaterally without Rales/Wheezes/Rhonchi.   Cardiovascular:  Regular rate and rhythm with normal S1/S2 without murmurs, rubs or gallops. Abdomen: Soft, non-tender, non-distended with normal bowel sounds. Musculoskeletal:  No clubbing, cyanosis or edema bilaterally. Skin: Skin color, texture, turgor normal.  No rashes or lesions. Neurologic:  Neurovascularly intact without any focal sensory/motor deficits. Cranial nerves: II-XII intact, grossly non-focal.  Psychiatric:  Alert and oriented, thought content appropriate, normal insight      Labs: For convenience and continuity at follow-up the following most recent labs are provided:    CBC:    Lab Results   Component Value Date    WBC 7.6 03/20/2021    HGB 13.1 03/20/2021    HCT 40.6 03/20/2021     03/20/2021       Renal:    Lab Results   Component Value Date     03/22/2021    K 4.1 03/22/2021    K 3.7 03/19/2021     03/22/2021    CO2 26 03/22/2021    BUN 15 03/22/2021    CREATININE <0.5 03/22/2021    CALCIUM 9.2 03/22/2021    PHOS 3.9 03/22/2021         Significant Diagnostic Studies    Radiology:   MRI brain without contrast   Final Result      1. Acute bland right basal ganglia infarct. 2.  Moderate chronic small vessel ischemic disease. 3.  Chronic microhemorrhage in the right aspect of the corky. CTA HEAD NECK W CONTRAST   Final Result      No significant flow-limiting stenosis or occlusion involving the anterior or posterior circulation      CTA NECK:   Dose modulation, iterative reconstruction and/or weight based adjustments of the mA/kV were utilized to reduce radiation dose to as low as reasonably achievable. Multiplanar reconstructed images and MIP images for CT angiogram.      Mild reticulation with mild diffuse bilateral ground glass opacities may relate to atelectasis pneumonitis or edema or acute atypical viral illness. Small bilateral hilar lymph nodes are noted indeterminate. There is calcification of the thoracic aorta.  The proximal left subclavian artery, brachycephalic artery, proximal right subclavian artery are patent without significant flow-limiting stenosis or occlusion. The left common carotid artery and right common    carotid artery patent without significant flow-limiting stenosis or occlusion. Mild calcification is noted. The internal carotid arteries are patent without significant flow-limiting stenosis or occlusion. The right and left vertebral arteries are patent without significant flow-limiting stenosis or occlusion. The basilar artery is patent without significant flow-limiting stenosis or occlusion. IMPRESSION:      No significant flow-limiting stenosis or occlusion involving the common carotid or internal carotid arteries or vertebral arteries. Reticulation with groundglass opacities involving the visualized lungs may relate to atelectasis pneumonitis or edema. Mildly enlarged visualized bilateral hilar lymph nodes may be inflammatory, infectious or neoplastic. CT HEAD WO CONTRAST   Final Result      No acute hemorrhage. Consults:     IP CONSULT TO HOSPITALIST  IP CONSULT TO NEUROLOGY    Disposition:  Home     Condition at Discharge: Stable    Discharge Instructions/Follow-up:  F/u w/ pcp    Code Status:  Full Code     Activity: activity as tolerated    Diet: dysphagia diet/ honey thick liquids      Discharge Medications:     Current Discharge Medication List           Details   aspirin EC 81 MG EC tablet Take 1 tablet by mouth daily  Qty: 30 tablet, Refills: 3      atorvastatin (LIPITOR) 80 MG tablet Take 1 tablet by mouth daily  Qty: 30 tablet, Refills: 3      lisinopril (PRINIVIL;ZESTRIL) 10 MG tablet Take 1 tablet by mouth daily  Qty: 30 tablet, Refills: 0           Time Spent on discharge is more than 25 in the examination, evaluation, counseling and review of medications and discharge plan.       Signed:    Lazaro Esposito MD   3/22/2021

## 2021-03-22 NOTE — PROGRESS NOTES
Speech Language Pathology  Dysphagia - contact note    Chart reviewed, d/w RN, okay to proceed with MBS this date. D/w pt who is agreeable and states understanding  Full note to follow      Cielo Perez M.S./[facility]-SLP #[id number]  Pg.  # [phone]

## 2021-03-22 NOTE — PROGRESS NOTES
Neurology Consult Progress Note     Patient Name: Leandra Hernandez YOB: 1955   Sex: Female Age: 60 yrs     CC / Reason for Consult: Ischemic stroke    Subjective: Doing well today, no new complaints, no events noted overnight    ROS: Denies any new CP, SOB, F/C, N/V    Vitals BP (!) 148/89   Pulse 88   Temp 98.1 °F (36.7 °C) (Oral)   Resp 16   Ht 5' (1.524 m)   Wt 243 lb 0.5 oz (110.2 kg)   SpO2 95%   BMI 47.46 kg/m²    Diet DIET CARDIAC; Isolation No active isolations     LABS     Basic Metabolic Profile Recent Labs     03/20/21  0614 03/21/21  0613 03/22/21  0551    141 139   K 4.0 4.1 4.1    105 103   CO2 29 29 26   BUN 10 12 15   CREATININE <0.5* <0.5* <0.5*   CALCIUM 9.0 9.0 9.2   LABALBU 4.0 3.9 4.1   PHOS 3.5 3.5 3.9      CBC / INR Recent Labs     03/19/21  1222 03/20/21  0614   WBC 7.4 7.6   RBC 4.68 4.64   HGB 13.2 13.1   HCT 40.7 40.6    219   INR 1.09  --       OTHER Recent Labs     03/20/21  0614   LABA1C 5.6   LDLCALC 76          IMAGING   MRI brain w/o Contrast  Impression       1.  Acute bland right basal ganglia infarct. 2.  Moderate chronic small vessel ischemic disease. 3.  Chronic microhemorrhage in the right aspect of the corky. CTA of head / neck w/ contrast      IMPRESSION:       No significant flow-limiting stenosis or occlusion involving the common carotid or internal carotid arteries or vertebral arteries.       Reticulation with groundglass opacities involving the visualized lungs may relate to atelectasis pneumonitis or edema. Mildly enlarged visualized bilateral hilar lymph nodes may be inflammatory, infectious or neoplastic. ECHO - LV normal, EF 92%, Grade 2 diastolic dysfunction, No R to L shunt    PHYSICAL EXAMINATION     Patient seen and examined   General: Well developed. Alert and cooperative in no acute distress. HENT: atraumatic, neck supple  Eyes: Sclera clear  Pulmonary: unlabored respiratory effort.    Cardiovascular:

## 2021-03-22 NOTE — PROGRESS NOTES
Tolerance: but appearing SOB w/ exertion  Safety Devices  Safety Devices in place: Yes  Type of devices: Nurse notified; Left in chair;Chair alarm in place;Call light within reach(setup w/ meal tray)           Patient Diagnosis(es): The primary encounter diagnosis was Aphasia. A diagnosis of Facial droop was also pertinent to this visit. has a past medical history of Rectal prolapse.   has no past surgical history on file. Treatment Diagnosis: impaired ADLs/mobility r/o CVA      Restrictions  Position Activity Restriction  Other position/activity restrictions: up with A prn    Subjective   General  Chart Reviewed: Yes  Additional Pertinent Hx: 60 y.o. female who presents with slurred speech. Hospital Course: CT Head: neg; MRI Brain: pending. PMH: Rectal Prolapse. Family / Caregiver Present: No  Referring Practitioner: Trung Koenig DO  Diagnosis: Ischemic Stroke    Subjective  Subjective: In chair on entry. Reports she developed facial droop at work and was sent to hospital. Feels her symptoms have resolved.     Pain Level: denies    Social/Functional History  Social/Functional History  Lives With: Family(dtr, granddtr, ex-)  Type of Home: House  Home Layout: Two level, Laundry in basement(bilevel)  Home Access: Stairs to enter with rails  Entrance Stairs - Number of Steps: 3 platform steps; 8-9 to main floor with rail  Bathroom Shower/Tub: Walk-in shower  Bathroom Toilet: Standard(sink next to)  Home Equipment: (no DME)  ADL Assistance: [address]: Independent  Homemaking Responsibilities: Yes  Bill Paying/Finance Responsibility: Primary(uses electronic payment methods; handles cash at times)  Health Care Management: Primary(does not take medication currently)  Ambulation Assistance: Independent  Transfer Assistance: Independent  Active : Yes  Mode of Transportation: Car  Education: Horticulture degree; associate's degree x 2  Occupation: Full time employment  Type of occupation:  at [address]: enjoys gardening, sewing, arts and crafts  Additional Comments: 1 month ago- tripped on edging in yard & fell while walking dog. Ex- home all day & watches the granddaughter       Objective   Vision: Within Functional Limits  Hearing: Within functional limits      Orientation  Overall Orientation Status: Within Functional Limits     Observation/Palpation  Observation: poorly fitting upper plate (reports she cannot afford to get them adjusted) - became tearful about topic; at times fisting L hand w/ flexed elbow during mobility (but no coordination or bimanual defits observed)     Balance  Sitting Balance: Contact guard assistance(with dynamic attempts for LB dressing; improved w/ AE)  Standing Balance: Stand by assistance(for most tasks; CG/Min A w/ attempts for donning socks in standing position)    Standing Balance  Time: >7 minutes; ~3-4 minutes  Activity: grooming, mobility in room/hallway; making bed  Comment: SOB on exertion - reports baseline; denies feeling SOB    Functional Mobility  Functional - Mobility Device: No device  Activity: To/from bathroom; Other  Functional Mobility Comments: SBA (lateral sway w/ mobility) - at times posturing LUE into flexed position (fisted hand and flexed elbow)    Toilet Transfers  Toilet - Technique: Ambulating  Equipment Used: Standard toilet  Toilet Transfer: Stand by assistance    ADL  Feeding: Independent(loose fitting upper plate)  Grooming: Stand by assistance(washing hands in standing at sink level)  LE Dressing: (Mod A (typically doffs socks by rubbing feet on floor to unroll them; typically dons socks in standing by placing foot onto step); educated about AE for increase independence and safety w/ task - receptive to education; introduced pt to reacher and socka)  Toileting: Stand by assistance     Tone RUE  RUE Tone: Normotonic  Tone LUE  LUE Tone: Normotonic  Coordination  Movements Are Yuliana OTR/L [id number]

## 2021-03-22 NOTE — PLAN OF CARE
Problem: HEMODYNAMIC STATUS  Goal: Patient has stable vital signs and fluid balance  Outcome: Ongoing     Problem: Falls - Risk of:  Goal: Will remain free from falls  Description: Will remain free from falls  Outcome: Ongoing   Patient is at risk for falls. Patient is in bed with bed alarm on, fall risk ID, Nonskid socks, Bed in lowest position. Call light and bedside table are within reach. Patient calls out approprietly. Will continue to monitor.

## 2021-03-22 NOTE — PROGRESS NOTES
Mrs. Dana Adair was discharged home with her daughter. We reviewed ways to decrease her risk factors for another stroke and her new medications. We also reviewed the need to schedule follow-up appointments with Neurology, Otolaryngology, and a new PCP. All questions were answered.

## 2021-03-22 NOTE — PLAN OF CARE
HEMODYNAMIC STATUS  Goal: Patient has stable vital signs and fluid balance  Outcome: Ongoing  Note: VSS with exception to high BP. NIH 0 at this time. ACTIVITY INTOLERANCE/IMPAIRED MOBILITY  Goal: Mobility/activity is maintained at optimum level for patient  Note: Mrs. Christopher El is walking well SBA. She reports having some motor control issues with her L hand, but I have not seen any spacicity or tremors.

## 2021-03-22 NOTE — DISCHARGE INSTR - COC
Continuity of Care Form    Patient Name: Cecy Mathews   :  1955  MRN:  [id number]    Admit date:  3/19/2021  Discharge date:  ***    Code Status Order: Full Code   Advance Directives:   Advance Care Flowsheet Documentation     Date/Time Healthcare Directive Type of Healthcare Directive Copy in [address] Agent's Name Healthcare Agent's Phone Number    21 1622  No, patient does not have an advance directive for healthcare treatment -- -- -- -- --          Admitting Physician:  Rajni Mckeon DO  PCP: Referring Not In System (Inactive)    Discharging Nurse: [facility] Unit/Room#: 6768/3802-27  Discharging Unit Phone Number: ***    Emergency Contact:   Extended Emergency Contact Information  Primary Emergency Contact: [address] [facility], [address] [facility] Phone: [phone]  Mobile Phone: [phone]  Relation: Child  Secondary Emergency Contact: Norma Lcuio  Mobile Phone: [phone]  Relation: Child    Past Surgical History:  History reviewed. No pertinent surgical history. Immunization History: There is no immunization history on file for this patient.     Active Problems:  Patient Active Problem List   Diagnosis Code    [facility] I63.9       Isolation/Infection:   Isolation          No Isolation        Patient Infection Status     None to display          Nurse Assessment:  Last Vital Signs: BP (!) 154/89   Pulse 76   Temp 97.8 °F (36.6 °C) (Oral)   Resp 16   Ht 5' (1.524 m)   Wt 243 lb 0.5 oz (110.2 kg)   SpO2 93%   BMI 47.46 kg/m²     Last documented pain score (0-10 scale): Pain Level: 0  Last Weight:   Wt Readings from Last 1 Encounters:   21 243 lb 0.5 oz (110.2 kg)     Mental Status:  {IP PT MENTAL STATUS:}    IV Access:  { ELIZABETH IV ACCESS:305656047}    Nursing Mobility/ADLs:  Walking   {CHP DME NEPP:673359066}  Transfer  {CHP DME TLOW:370815056}  Bathing  {CHP DME ACJD:566123395}  Dressing  {CHP DME TCCN:289086843} Toileting  {CHP DME NJNC:921543279}  Feeding  {CHP DME KWIW:278802339}  Med Admin  {CHP DME WUQR:018187303}  Med Delivery   { ELIZABETH MED Delivery:551852575}    Wound Care Documentation and Therapy:        Elimination:  Continence:   · Bowel: {YES / HM:52038}  · Bladder: {YES / TK:18648}  Urinary Catheter: {Urinary Catheter:468156963}   Colostomy/Ileostomy/Ileal Conduit: {YES / TS:41254}       Date of Last BM: ***    Intake/Output Summary (Last 24 hours) at 3/22/2021 1210  Last data filed at 3/22/2021 0815  Gross per 24 hour   Intake 540 ml   Output    Net 540 ml     I/O last 3 completed shifts:   In: 180 [P.O.:180]  Out: -     Safety Concerns:     [address] [employer] Safety Concerns:094454908}    Impairments/Disabilities:      [address] [employer] Impairments/Disabilities:443170841}    Nutrition Therapy:  Current Nutrition Therapy:   [address] [employer] Diet List:499029154}    Routes of Feeding: {[facility] DME Other Feedings:343299365}  Liquids: {Slp liquid thickness:88558}  Daily Fluid Restriction: {P DME Yes amt example:536793307}  Last Modified Barium Swallow with Video (Video Swallowing Test): {Done Not Done MIJV:832457400}    Treatments at the Time of Hospital Discharge:   Respiratory Treatments: ***  Oxygen Therapy:  {Therapy; copd oxygen:75826}  Ventilator:    { CC Vent FCBC:922712424}    Rehab Therapies: {THERAPEUTIC INTERVENTION:0094023021}  Weight Bearing Status/Restrictions: [address] [employer] Weight Bearin}  Other Medical Equipment (for information only, NOT a DME order):  {EQUIPMENT:869527706}  Other Treatments: ***    Patient's personal belongings (please select all that are sent with patient):  {[facility] DME Belongings:011937180}    RN SIGNATURE:  {Esignature:258901347}    CASE MANAGEMENT/SOCIAL WORK SECTION    Inpatient Status Date: 3/19/2021    Readmission Risk Assessment Score:  Readmission Risk              Risk of Unplanned Readmission:        8           / signature: Electronically signed by Tomas Ho RN on 3/22/21 at 12:11 PM EDT    PHYSICIAN SECTION    Prognosis: {Prognosis:4044399623}    Condition at Discharge: [address] Patient Condition:399098243}    Rehab Potential (if transferring to Rehab): {Prognosis:8464711738}    Recommended Labs or Other Treatments After Discharge: ***    Physician Certification: I certify the above information and transfer of Sánchez Harrison  is necessary for the continuing treatment of the diagnosis listed and that she requires {Admit to Appropriate Level of Care:50393} for {GREATER/LESS:130369445} 30 days.      Update Admission H&P: {CHP DME Changes in QYTKA:874881587}    PHYSICIAN SIGNATURE:  {Esignature:814437535}

## 2021-03-31 ENCOUNTER — OFFICE VISIT (OUTPATIENT)
Dept: ENT CLINIC | Age: 66
End: 2021-03-31
Payer: COMMERCIAL

## 2021-03-31 VITALS
SYSTOLIC BLOOD PRESSURE: 153 MMHG | HEART RATE: 86 BPM | WEIGHT: 229.8 LBS | BODY MASS INDEX: 44.88 KG/M2 | TEMPERATURE: 97.9 F | DIASTOLIC BLOOD PRESSURE: 89 MMHG

## 2021-03-31 DIAGNOSIS — R49.9 HOARSENESS OR CHANGING VOICE: Primary | ICD-10-CM

## 2021-03-31 DIAGNOSIS — R09.89 GLOBUS PHARYNGEUS: ICD-10-CM

## 2021-03-31 DIAGNOSIS — R05.9 COUGH: ICD-10-CM

## 2021-03-31 PROCEDURE — 99243 OFF/OP CNSLTJ NEW/EST LOW 30: CPT | Performed by: OTOLARYNGOLOGY

## 2021-03-31 PROCEDURE — 31575 DIAGNOSTIC LARYNGOSCOPY: CPT | Performed by: OTOLARYNGOLOGY

## 2021-03-31 RX ORDER — PANTOPRAZOLE SODIUM 40 MG/1
40 TABLET, DELAYED RELEASE ORAL
Qty: 30 TABLET | Refills: 5 | Status: SHIPPED | OUTPATIENT
Start: 2021-03-31

## 2021-03-31 NOTE — PROGRESS NOTES
CHIEF COMPLAINT: Hoarseness    HISTORY OF PRESENT ILLNESS:  60 y.o. female referred by Dr. Franco Valdovinos who presents with hoarseness of 10 days duration. Relates onset to CVA 10 days ago. In hospital for 3 days. Had weakness of the left upper extremity, and some facial droop. No dysphagia. No airway concerns. Also has a lot of phlegm in the back of her throat. Has globus sensation. Worse when it first gets up in th eAM.  No nasal obstruction. PAST MEDICAL HISTORY:   Social History     Tobacco Use   Smoking Status Never Smoker   Smokeless Tobacco Never Used                                                    Social History     Substance and Sexual Activity   Alcohol Use Never    Frequency: Never                                                    Current Outpatient Medications:     aspirin EC 81 MG EC tablet, Take 1 tablet by mouth daily, Disp: 30 tablet, Rfl: 3    atorvastatin (LIPITOR) 80 MG tablet, Take 1 tablet by mouth daily, Disp: 30 tablet, Rfl: 3    lisinopril (PRINIVIL;ZESTRIL) 10 MG tablet, Take 1 tablet by mouth daily, Disp: 30 tablet, Rfl: 0                                                 Past Medical History:   Diagnosis Date    Rectal prolapse                                                   No past surgical history on file. FAMILY HISTORY: Family history reviewed. Except as noted in history of present illness, there is no pertinent family history      REVIEW OF SYSTEMS:  All pertinent positive and negative review of systems included in HPI. Otherwise, all systems are reviewed and negative.     PHYSICAL EXAMINATION:   GENERAL: wdwn- no acute distress  RESPIRATORY:  No stridor or respiratory distress  COMMUNICATION :  Normal voice  MENTAL STATUS:  Mood and affect normal, oriented X 3  HEAD AND FACE:  No abnormalities of the skin of face or head  EXTERNAL EARS AND NOSE:  Normal pinnae bilateral  FACIAL MUSCLES:  All branches of facial nerve intact  EXTRAOCULAR MUSCLES: Intact with full range of motion  FACE PALPATION:  No tenderness over sinuses. Zygomatic arches and orbital rims intact  OTOSCOPY:  Normal external auditory canals, tympanic membranes, and middle ear spaces  TUNING FORKS: Rinne ++ Cardenas midline at 512 Hz  INTRANASAL:  Septum midline, turbinates normal, meati clear. LIPS, TEETH, GINGIVA:  Normal mucosa  PHARYNX:  Normal  NECK:  No masses. LYMPHATIC:  No cervical adenopathy  SALIVARY GLANDS:  No swelling or masses in the parotid or submandibular salivary glands  THYROID:  No goiter or thyroid masses. As the patient has symptoms suggestive of disease in the larynx or hypopharynx, fiberoptic laryngoscopy is performed. FIBEROPTIC LARYNGOSCOPY:  Nares topically anaesthetized with lidocaine spray. Fiberoptic scope passed per naris into nasopharynx and hypopharyrnx and larynx visualized. Normal tongue base                                                          Normal epiglottis                                                          Normal vocal cords                                                          Normal pyriform sinuses  IMPRESSION: The patient's vocal cords were not affected by her cerebrovascular accident. SPECT that her throat symptoms are secondary to extra esophageal reflux disease. PLAN: Trial of pantoprazole 40 mg with the evening meal.    FOLLOW-UP: 1 month.

## 2021-04-14 ENCOUNTER — APPOINTMENT (OUTPATIENT)
Dept: GENERAL RADIOLOGY | Age: 66
End: 2021-04-14
Payer: COMMERCIAL

## 2021-04-14 ENCOUNTER — HOSPITAL ENCOUNTER (EMERGENCY)
Age: 66
Discharge: HOME | End: 2021-04-14
Attending: EMERGENCY MEDICINE
Payer: COMMERCIAL

## 2021-04-14 VITALS
HEART RATE: 78 BPM | RESPIRATION RATE: 16 BRPM | SYSTOLIC BLOOD PRESSURE: 187 MMHG | DIASTOLIC BLOOD PRESSURE: 78 MMHG | OXYGEN SATURATION: 92 % | HEIGHT: 60 IN | WEIGHT: 112.9 LBS | TEMPERATURE: 98.1 F | BODY MASS INDEX: 22.16 KG/M2

## 2021-04-14 DIAGNOSIS — I10 HYPERTENSION, UNSPECIFIED TYPE: Primary | ICD-10-CM

## 2021-04-14 LAB
ANION GAP SERPL CALCULATED.3IONS-SCNC: 10 MMOL/L (ref 3–16)
BASOPHILS ABSOLUTE: 0.1 K/UL (ref 0–0.2)
BASOPHILS RELATIVE PERCENT: 0.8 %
BUN BLDV-MCNC: 10 MG/DL (ref 7–20)
CALCIUM SERPL-MCNC: 9.2 MG/DL (ref 8.3–10.6)
CHLORIDE BLD-SCNC: 106 MMOL/L (ref 99–110)
CO2: 27 MMOL/L (ref 21–32)
CREAT SERPL-MCNC: <0.5 MG/DL (ref 0.6–1.2)
EOSINOPHILS ABSOLUTE: 0.1 K/UL (ref 0–0.6)
EOSINOPHILS RELATIVE PERCENT: 0.9 %
GFR AFRICAN AMERICAN: >60
GFR NON-AFRICAN AMERICAN: >60
GLUCOSE BLD-MCNC: 96 MG/DL (ref 70–99)
HCT VFR BLD CALC: 41.1 % (ref 36–48)
HEMOGLOBIN: 13.7 G/DL (ref 12–16)
LYMPHOCYTES ABSOLUTE: 1.6 K/UL (ref 1–5.1)
LYMPHOCYTES RELATIVE PERCENT: 20.4 %
MAGNESIUM: 2.3 MG/DL (ref 1.8–2.4)
MCH RBC QN AUTO: 28.4 PG (ref 26–34)
MCHC RBC AUTO-ENTMCNC: 33.2 G/DL (ref 31–36)
MCV RBC AUTO: 85.6 FL (ref 80–100)
MONOCYTES ABSOLUTE: 0.6 K/UL (ref 0–1.3)
MONOCYTES RELATIVE PERCENT: 8.1 %
NEUTROPHILS ABSOLUTE: 5.3 K/UL (ref 1.7–7.7)
NEUTROPHILS RELATIVE PERCENT: 69.8 %
PDW BLD-RTO: 15.1 % (ref 12.4–15.4)
PHOSPHORUS: 3.7 MG/DL (ref 2.5–4.9)
PLATELET # BLD: 272 K/UL (ref 135–450)
PMV BLD AUTO: 8.3 FL (ref 5–10.5)
POTASSIUM SERPL-SCNC: 4.3 MMOL/L (ref 3.5–5.1)
PRO-BNP: 511 PG/ML (ref 0–124)
RBC # BLD: 4.8 M/UL (ref 4–5.2)
SODIUM BLD-SCNC: 143 MMOL/L (ref 136–145)
WBC # BLD: 7.6 K/UL (ref 4–11)

## 2021-04-14 PROCEDURE — 83880 ASSAY OF NATRIURETIC PEPTIDE: CPT

## 2021-04-14 PROCEDURE — 84100 ASSAY OF PHOSPHORUS: CPT

## 2021-04-14 PROCEDURE — 99283 EMERGENCY DEPT VISIT LOW MDM: CPT

## 2021-04-14 PROCEDURE — 71045 X-RAY EXAM CHEST 1 VIEW: CPT

## 2021-04-14 PROCEDURE — 93005 ELECTROCARDIOGRAM TRACING: CPT | Performed by: FAMILY MEDICINE

## 2021-04-14 PROCEDURE — 83735 ASSAY OF MAGNESIUM: CPT

## 2021-04-14 PROCEDURE — 85025 COMPLETE CBC W/AUTO DIFF WBC: CPT

## 2021-04-14 PROCEDURE — 80048 BASIC METABOLIC PNL TOTAL CA: CPT

## 2021-04-14 NOTE — ED PROVIDER NOTES
[address] [facility] ENCOUNTER          ATTENDING PHYSICIAN NOTE       Date of evaluation: 4/14/2021    Chief Complaint     Hypertension (Pt had routine blood B/P this am and it was 184/78; pt was concerned and came to get checked out)      History of Present Illness     Jeet Zaragoza is a 60 y.o. female who presents emergency department with a complaint of elevated blood pressure. The patient has a prior history of a recent stroke approximately 4 weeks ago. She had her blood pressure checked today at her place of business and it was noted to be very high. She was concerned about this so presents for evaluation. Denies any chest pain shortness of breath orthopnea paroxysmal nocturnal dyspnea has had some mild increased leg swelling since the time of her recent stroke. On that admission she was also recently started on lisinopril 10 mg daily. She reports no other changes to this medication. She has an appoint with a primary care provider in 2 days. The patient reports no active chest pain no belly pain no headache blurry vision double vision changes of vision denies any difficulty speaking denies any weakness on one side of the face or body. Review of Systems     As documented in the HPI, otherwise all other systems were reviewed and were negative. Past Medical, Surgical, Family, and Social History     She has a past medical history of Dental disease, Hypertension, Rectal prolapse, Sinusitis, and Tinnitus. She has a past surgical history that includes Appendectomy. Her family history includes No Known Problems in her father and mother. She reports that she has never smoked. She has never used smokeless tobacco. She reports that she does not drink alcohol or use drugs.     Medications     Previous Medications    ASPIRIN EC 81 MG EC TABLET    Take 1 tablet by mouth daily    ATORVASTATIN (LIPITOR) 80 MG TABLET    Take 1 tablet by mouth daily    LISINOPRIL (PRINIVIL;ZESTRIL) 10 MG TABLET    Take 1 tablet by mouth daily    PANTOPRAZOLE (PROTONIX) 40 MG TABLET    Take 1 tablet by mouth Daily with supper       Allergies     She has No Known Allergies. Physical Exam     INITIAL VITALS: BP: (!) 179/99, Temp: 98.1 °F (36.7 °C), Pulse: 77, Resp: 16, SpO2: 98 %   General: Well-appearing 60year-old sitting in bed in no apparent cardiorespiratory distress  HEENT:  head is atraumatic, pupils equal round and reactive to light, sclera are clear, oropharynx is nonerythematous  Neck: supple, no lymphadenopathy  Chest: nonlabored respirations, equal chest rise bilaterally, no accessory muscle use, clear to auscultation bilaterally without wheezes rhonchi or rales  Cardiovascular: Regular, rate, and rhythm, 2+ radial pulses bilaterally, capillary refill 2 seconds  Abdominal: Soft, nontender, nondistended, positive bowel sounds throughout, no rebound or guarding  Skin: Warm, dry well perfused, no rashes  Musculoskeletal: no obvious deformities, no tenderness to palpation diffusely, mild bilateral lower extremity pitting edema  Neurologic:  alert and oriented x4, speech is clear and intact without dysarthria, gait is intact    Diagnostic Results     EKG   LVH with out any evidence of any ST or T wave changes that would be indicative of active ischemia there is a left axis deviation to normal sinus rhythm ventricular rate is 79 intervals are normal in comparison to a prior EKG that was done in March of this year the patient has no significant interval changes    RADIOLOGY:  XR CHEST PORTABLE   Final Result   Impression: Mild cardiomegaly. Linear atelectasis of the left lung base.           LABS:   Results for orders placed or performed during the hospital encounter of 67/49/88   Basic Metabolic Panel   Result Value Ref Range    Sodium 143 136 - 145 mmol/L    Potassium 4.3 3.5 - 5.1 mmol/L    Chloride 106 99 - 110 mmol/L    CO2 27 21 - 32 mmol/L    Anion Gap 10 3 - 16    Glucose 96 70 - 99 mg/dL    BUN these returned back and were normal.  The patient's basic metabolic profile was within normal limits mag and Phos were normal CBC within normal limits had BNP which was normal for the patient's age chest x-ray with no pulmonary edema. The patient's blood pressure spontaneously improved while here in the emergency department and she was ultimately felt safe for discharge home with follow-up with her primary care provider 2 days as was already scheduled. Clinical Impression     1.  Hypertension, unspecified type        Disposition     PATIENT REFERRED TO:  Referring Not In System      On Friday as Scheduled      DISCHARGE MEDICATIONS:  New Prescriptions    No medications on file       DISPOSITION Decision To Discharge 04/14/2021 12:57:02 PM         Franklyn Arreaga MD  04/14/21 3585

## 2021-04-15 LAB
EKG ATRIAL RATE: 79 BPM
EKG DIAGNOSIS: NORMAL
EKG P AXIS: 23 DEGREES
EKG P-R INTERVAL: 192 MS
EKG Q-T INTERVAL: 400 MS
EKG QRS DURATION: 82 MS
EKG QTC CALCULATION (BAZETT): 458 MS
EKG R AXIS: -21 DEGREES
EKG T AXIS: 47 DEGREES
EKG VENTRICULAR RATE: 79 BPM

## 2021-04-26 ENCOUNTER — OFFICE VISIT (OUTPATIENT)
Dept: ENT CLINIC | Age: 66
End: 2021-04-26
Payer: COMMERCIAL

## 2021-04-26 VITALS
WEIGHT: 220.4 LBS | SYSTOLIC BLOOD PRESSURE: 137 MMHG | BODY MASS INDEX: 43.27 KG/M2 | HEART RATE: 80 BPM | HEIGHT: 60 IN | DIASTOLIC BLOOD PRESSURE: 83 MMHG | TEMPERATURE: 97.2 F

## 2021-04-26 DIAGNOSIS — R09.89 GLOBUS PHARYNGEUS: ICD-10-CM

## 2021-04-26 DIAGNOSIS — R49.9 HOARSENESS OR CHANGING VOICE: Primary | ICD-10-CM

## 2021-04-26 PROCEDURE — 99212 OFFICE O/P EST SF 10 MIN: CPT | Performed by: OTOLARYNGOLOGY

## 2021-04-26 NOTE — PROGRESS NOTES
ears and nasal pyramid are normal.  FACIAL MUSCLES: All branches of the facial nerve intact. EXTRAOCULAR MUSCLES: Intact with full range of motion. LIPS, TEETH, GINGIVA:  Normal mucosa  PHARYNX:  Normal  NECK:  No masses. LYMPH NODES: No cervical lymphadenopathy. SALIVARY GLANDS: Parotid and submandibular glands normal.  THYROID: No goiter or thyroid nodules palpable. IMPRESSION: Hoarseness and phlegm in the throat not improved with proton pump inhibitor. PLAN: Add fluticasone nasal spray in both nostrils every night. FOLLOW-UP: 1 month.

## 2021-05-26 ENCOUNTER — HOSPITAL ENCOUNTER (EMERGENCY)
Age: 66
Discharge: HOME | End: 2021-05-26
Attending: EMERGENCY MEDICINE
Payer: COMMERCIAL

## 2021-05-26 VITALS
HEART RATE: 90 BPM | SYSTOLIC BLOOD PRESSURE: 156 MMHG | RESPIRATION RATE: 22 BRPM | DIASTOLIC BLOOD PRESSURE: 73 MMHG | OXYGEN SATURATION: 100 % | TEMPERATURE: 98.6 F

## 2021-05-26 DIAGNOSIS — R42 LIGHTHEADEDNESS: Primary | ICD-10-CM

## 2021-05-26 LAB
ANION GAP SERPL CALCULATED.3IONS-SCNC: 11 MMOL/L (ref 3–16)
BASOPHILS ABSOLUTE: 0.1 K/UL (ref 0–0.2)
BASOPHILS RELATIVE PERCENT: 1.1 %
BUN BLDV-MCNC: 14 MG/DL (ref 7–20)
CALCIUM SERPL-MCNC: 9 MG/DL (ref 8.3–10.6)
CHLORIDE BLD-SCNC: 105 MMOL/L (ref 99–110)
CO2: 23 MMOL/L (ref 21–32)
CREAT SERPL-MCNC: <0.5 MG/DL (ref 0.6–1.2)
EOSINOPHILS ABSOLUTE: 0.1 K/UL (ref 0–0.6)
EOSINOPHILS RELATIVE PERCENT: 0.7 %
GFR AFRICAN AMERICAN: >60
GFR NON-AFRICAN AMERICAN: >60
GLUCOSE BLD-MCNC: 113 MG/DL (ref 70–99)
HCT VFR BLD CALC: 41.1 % (ref 36–48)
HEMOGLOBIN: 13.5 G/DL (ref 12–16)
LYMPHOCYTES ABSOLUTE: 1.3 K/UL (ref 1–5.1)
LYMPHOCYTES RELATIVE PERCENT: 15.3 %
MCH RBC QN AUTO: 28.5 PG (ref 26–34)
MCHC RBC AUTO-ENTMCNC: 32.9 G/DL (ref 31–36)
MCV RBC AUTO: 86.6 FL (ref 80–100)
MONOCYTES ABSOLUTE: 0.5 K/UL (ref 0–1.3)
MONOCYTES RELATIVE PERCENT: 5.7 %
NEUTROPHILS ABSOLUTE: 6.6 K/UL (ref 1.7–7.7)
NEUTROPHILS RELATIVE PERCENT: 77.2 %
PDW BLD-RTO: 15.6 % (ref 12.4–15.4)
PLATELET # BLD: 225 K/UL (ref 135–450)
PMV BLD AUTO: 8.6 FL (ref 5–10.5)
POTASSIUM REFLEX MAGNESIUM: 4.7 MMOL/L (ref 3.5–5.1)
RBC # BLD: 4.75 M/UL (ref 4–5.2)
SODIUM BLD-SCNC: 139 MMOL/L (ref 136–145)
TROPONIN: <0.01 NG/ML
WBC # BLD: 8.6 K/UL (ref 4–11)

## 2021-05-26 PROCEDURE — 85025 COMPLETE CBC W/AUTO DIFF WBC: CPT

## 2021-05-26 PROCEDURE — 84484 ASSAY OF TROPONIN QUANT: CPT

## 2021-05-26 PROCEDURE — 80048 BASIC METABOLIC PNL TOTAL CA: CPT

## 2021-05-26 PROCEDURE — 99283 EMERGENCY DEPT VISIT LOW MDM: CPT

## 2021-05-26 PROCEDURE — 36415 COLL VENOUS BLD VENIPUNCTURE: CPT

## 2021-05-26 ASSESSMENT — ENCOUNTER SYMPTOMS
VOMITING: 0
COUGH: 0
CHEST TIGHTNESS: 0
WHEEZING: 0
ABDOMINAL PAIN: 0
NAUSEA: 0
DIARRHEA: 0
SHORTNESS OF BREATH: 0

## 2021-05-26 NOTE — ED PROVIDER NOTES
[facility]          PHYSICIAN ASSISTANT NOTE       Date of evaluation: 5/26/2021    Chief Complaint     Dizziness (felt light headed at work)    History of Present Illness     Elton Calle is a 60 y.o. female who presents with dizziness. 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 She called EMS who transported her here. Denies any ongoing symptoms. Denies any chest pain, shortness of breath, blurred vision, nausea, slurred speech, weakness to the extremities during this episode. Does endorse that she recently started a new medication, amlodipine, for hypertension 3 days ago. Review of Systems     Review of Systems   Constitutional: Negative for chills, diaphoresis, fatigue and fever. Respiratory: Negative for cough, chest tightness, shortness of breath and wheezing. Cardiovascular: Negative for chest pain and palpitations. Gastrointestinal: Negative for abdominal pain, diarrhea, nausea and vomiting. Genitourinary: Negative. Musculoskeletal: Negative. Skin: Negative for rash. Neurological: Positive for light-headedness. Negative for dizziness, weakness and headaches. All other systems reviewed and are negative. Past Medical, Surgical, Family, and Social History     She has a past medical history of Dental disease, Hypertension, Rectal prolapse, Sinusitis, and Tinnitus. She has a past surgical history that includes Appendectomy. Her family history includes No Known Problems in her father and mother. She reports that she has never smoked. She has never used smokeless tobacco. She reports that she does not drink alcohol and does not use drugs.     Medications     Discharge Medication List as of 5/26/2021  2:23 PM      CONTINUE these medications which have NOT CHANGED    Details   pantoprazole (PROTONIX) 40 MG tablet Take 1 tablet by mouth Daily with supper, Disp-30 tablet, R-5Normal      aspirin EC 81 MG EC tablet Take 1 tablet by mouth daily, Disp-30 tablet, R-3Normal      atorvastatin (LIPITOR) 80 MG tablet Take 1 tablet by mouth daily, Disp-30 tablet, R-3Normal      lisinopril (PRINIVIL;ZESTRIL) 10 MG tablet Take 1 tablet by mouth daily, Disp-30 tablet, R-0Normal             Allergies     She has No Known Allergies. Physical Exam     INITIAL VITALS: BP: (!) 156/73, Temp: 98.6 °F (37 °C), Pulse: 90, Resp: 22, SpO2: 100 %  Physical Exam  Vitals and nursing note reviewed. Constitutional:       General: She is not in acute distress. Appearance: She is well-developed. She is not diaphoretic. HENT:      Head: Normocephalic and atraumatic. Eyes:      Conjunctiva/sclera: Conjunctivae normal.      Pupils: Pupils are equal, round, and reactive to light. Cardiovascular:      Rate and Rhythm: Normal rate and regular rhythm. Heart sounds: Normal heart sounds. No murmur heard. No friction rub. Pulmonary:      Effort: Pulmonary effort is normal. No respiratory distress. Breath sounds: Normal breath sounds. No wheezing or rales. Abdominal:      General: There is no distension. Palpations: Abdomen is soft. Tenderness: There is no abdominal tenderness. There is no guarding or rebound. Musculoskeletal:         General: Normal range of motion. Cervical back: Normal range of motion. Skin:     General: Skin is warm and dry. Neurological:      Mental Status: She is alert and oriented to person, place, and time. Psychiatric:         Behavior: Behavior normal.         Thought Content:  Thought content normal.         Judgment: Judgment normal.         Diagnostic Results     EKG   Interpreted in conjunction with emergency department physician No att. providers found  Rhythm: normal sinus   Rate: 84  Axis: left  Conduction: normal  ST Segments: no acute change  T Waves: flattening in  III  Q Waves:none  Clinical Impression: no acute changes  Comparison:  4/21    RADIOLOGY:  No orders to display       LABS:   Results for orders placed or performed during the hospital encounter of 05/26/21   CBC Auto Differential   Result Value Ref Range    WBC 8.6 4.0 - 11.0 K/uL    RBC 4.75 4.00 - 5.20 M/uL    Hemoglobin 13.5 12.0 - 16.0 g/dL    Hematocrit 41.1 36.0 - 48.0 %    MCV 86.6 80.0 - 100.0 fL    MCH 28.5 26.0 - 34.0 pg    MCHC 32.9 31.0 - 36.0 g/dL    RDW 15.6 (H) 12.4 - 15.4 %    Platelets 597 829 - 505 K/uL    MPV 8.6 5.0 - 10.5 fL    Neutrophils % 77.2 %    Lymphocytes % 15.3 %    Monocytes % 5.7 %    Eosinophils % 0.7 %    Basophils % 1.1 %    Neutrophils Absolute 6.6 1.7 - 7.7 K/uL    Lymphocytes Absolute 1.3 1.0 - 5.1 K/uL    Monocytes Absolute 0.5 0.0 - 1.3 K/uL    Eosinophils Absolute 0.1 0.0 - 0.6 K/uL    Basophils Absolute 0.1 0.0 - 0.2 K/uL   Basic Metabolic Panel w/ Reflex to MG   Result Value Ref Range    Sodium 139 136 - 145 mmol/L    Potassium reflex Magnesium 4.7 3.5 - 5.1 mmol/L    Chloride 105 99 - 110 mmol/L    CO2 23 21 - 32 mmol/L    Anion Gap 11 3 - 16    Glucose 113 (H) 70 - 99 mg/dL    BUN 14 7 - 20 mg/dL    CREATININE <0.5 (L) 0.6 - 1.2 mg/dL    GFR Non-African American >60 >60    GFR African American >60 >60    Calcium 9.0 8.3 - 10.6 mg/dL   Troponin   Result Value Ref Range    Troponin <0.01 <0.01 ng/mL       ED BEDSIDE ULTRASOUND:  none    RECENT VITALS:  BP: (!) 156/73, Temp: 98.6 °F (37 °C), Pulse: 90, Resp: 22, SpO2: 100 %     Procedures     none    ED Course     Nursing Notes, Past Medical Hx,Past Surgical Hx, Social Hx, Allergies, and Family Hx were reviewed. The patient was given the following medications:  No orders of the defined types were placed in this encounter. CONSULTS:  None    MEDICAL DECISION MAKING / ASSESSMENT / Kaylan Luke is a 60 y.o. female presenting with concerns of lightheadedness while at work. She is hemodynamically stable on arrival and in no acute distress.   EKG was

## 2021-09-30 ENCOUNTER — HOSPITAL ENCOUNTER (EMERGENCY)
Age: 66
Discharge: HOME | End: 2021-09-30
Attending: EMERGENCY MEDICINE
Payer: COMMERCIAL

## 2021-09-30 ENCOUNTER — APPOINTMENT (OUTPATIENT)
Dept: GENERAL RADIOLOGY | Age: 66
End: 2021-09-30
Payer: COMMERCIAL

## 2021-09-30 VITALS
HEART RATE: 82 BPM | WEIGHT: 197 LBS | OXYGEN SATURATION: 97 % | DIASTOLIC BLOOD PRESSURE: 74 MMHG | SYSTOLIC BLOOD PRESSURE: 148 MMHG | HEIGHT: 60 IN | BODY MASS INDEX: 38.68 KG/M2 | RESPIRATION RATE: 16 BRPM | TEMPERATURE: 98.4 F

## 2021-09-30 DIAGNOSIS — I10 ESSENTIAL HYPERTENSION: Primary | ICD-10-CM

## 2021-09-30 LAB
ANION GAP SERPL CALCULATED.3IONS-SCNC: 12 MMOL/L (ref 3–16)
BASOPHILS ABSOLUTE: 0.1 K/UL (ref 0–0.2)
BASOPHILS RELATIVE PERCENT: 0.9 %
BILIRUBIN URINE: NEGATIVE
BLOOD, URINE: NEGATIVE
BUN BLDV-MCNC: 13 MG/DL (ref 7–20)
CALCIUM SERPL-MCNC: 9.3 MG/DL (ref 8.3–10.6)
CHLORIDE BLD-SCNC: 106 MMOL/L (ref 99–110)
CLARITY: CLEAR
CO2: 25 MMOL/L (ref 21–32)
COLOR: YELLOW
CREAT SERPL-MCNC: <0.5 MG/DL (ref 0.6–1.2)
EKG ATRIAL RATE: 72 BPM
EKG DIAGNOSIS: NORMAL
EKG P AXIS: 63 DEGREES
EKG P-R INTERVAL: 172 MS
EKG Q-T INTERVAL: 418 MS
EKG QRS DURATION: 76 MS
EKG QTC CALCULATION (BAZETT): 457 MS
EKG R AXIS: -16 DEGREES
EKG T AXIS: 61 DEGREES
EKG VENTRICULAR RATE: 72 BPM
EOSINOPHILS ABSOLUTE: 0.2 K/UL (ref 0–0.6)
EOSINOPHILS RELATIVE PERCENT: 2.9 %
GFR AFRICAN AMERICAN: >60
GFR NON-AFRICAN AMERICAN: >60
GLUCOSE BLD-MCNC: 90 MG/DL (ref 70–99)
GLUCOSE URINE: NEGATIVE MG/DL
HCT VFR BLD CALC: 40.5 % (ref 36–48)
HEMOGLOBIN: 13.4 G/DL (ref 12–16)
KETONES, URINE: NEGATIVE MG/DL
LEUKOCYTE ESTERASE, URINE: NEGATIVE
LYMPHOCYTES ABSOLUTE: 1.6 K/UL (ref 1–5.1)
LYMPHOCYTES RELATIVE PERCENT: 27.1 %
MCH RBC QN AUTO: 29.5 PG (ref 26–34)
MCHC RBC AUTO-ENTMCNC: 33 G/DL (ref 31–36)
MCV RBC AUTO: 89.3 FL (ref 80–100)
MICROSCOPIC EXAMINATION: NORMAL
MONOCYTES ABSOLUTE: 0.5 K/UL (ref 0–1.3)
MONOCYTES RELATIVE PERCENT: 8.1 %
NEUTROPHILS ABSOLUTE: 3.5 K/UL (ref 1.7–7.7)
NEUTROPHILS RELATIVE PERCENT: 61 %
NITRITE, URINE: NEGATIVE
PDW BLD-RTO: 14.4 % (ref 12.4–15.4)
PH UA: 7 (ref 5–8)
PLATELET # BLD: 211 K/UL (ref 135–450)
PMV BLD AUTO: 8.3 FL (ref 5–10.5)
POTASSIUM SERPL-SCNC: 4.3 MMOL/L (ref 3.5–5.1)
PROTEIN UA: NEGATIVE MG/DL
RBC # BLD: 4.53 M/UL (ref 4–5.2)
SODIUM BLD-SCNC: 143 MMOL/L (ref 136–145)
SPECIFIC GRAVITY UA: 1.01 (ref 1–1.03)
TROPONIN: <0.01 NG/ML
URINE TYPE: NORMAL
UROBILINOGEN, URINE: 0.2 E.U./DL
WBC # BLD: 5.8 K/UL (ref 4–11)

## 2021-09-30 PROCEDURE — 36415 COLL VENOUS BLD VENIPUNCTURE: CPT

## 2021-09-30 PROCEDURE — 81003 URINALYSIS AUTO W/O SCOPE: CPT

## 2021-09-30 PROCEDURE — 80048 BASIC METABOLIC PNL TOTAL CA: CPT

## 2021-09-30 PROCEDURE — 99283 EMERGENCY DEPT VISIT LOW MDM: CPT

## 2021-09-30 PROCEDURE — 71045 X-RAY EXAM CHEST 1 VIEW: CPT

## 2021-09-30 PROCEDURE — 93005 ELECTROCARDIOGRAM TRACING: CPT | Performed by: EMERGENCY MEDICINE

## 2021-09-30 PROCEDURE — 84484 ASSAY OF TROPONIN QUANT: CPT

## 2021-09-30 PROCEDURE — 85025 COMPLETE CBC W/AUTO DIFF WBC: CPT

## 2021-09-30 RX ORDER — AMLODIPINE BESYLATE 5 MG/1
TABLET ORAL
COMMUNITY
Start: 2021-09-12

## 2021-09-30 ASSESSMENT — ENCOUNTER SYMPTOMS
GASTROINTESTINAL NEGATIVE: 1
RESPIRATORY NEGATIVE: 1
EYES NEGATIVE: 1

## 2021-09-30 NOTE — ED TRIAGE NOTES
Pt was on her way to work and says she \"felt off\" and decided to check her blood pressure. Systolic was over 961.

## 2021-09-30 NOTE — ED NOTES
Bed: A06-06  Expected date:   Expected time:   Means of arrival:   Comments:  [place]    60 y.o. F  /84  [place] off this morning     Nichol Nielsen RN  09/30/21 7670

## 2021-09-30 NOTE — ED PROVIDER NOTES
[address] [facility] [address] ENCOUNTER          ATTENDING PHYSICIAN NOTE       Date of evaluation: 9/30/2021    Chief Complaint     Hypertension      History of Present Illness     Khai Preston is a 60 y.o. female who presents to the emergency department complaining of lightheadedness. Patient states that she woke up this morning for work and felt in her normal state of health. She states she was able to do her morning chores and then drive to work. She states when she arrived she got on the car and felt lightheaded and fatigued. She checked her blood pressure and her blood pressure was elevated so called 911. On arrival, patient states that the fatigue sensation has improved. She denies any chest pain or pressure. She denies any shortness of breath or cough. She denies any fevers or chills. She denies any nausea, vomiting, or diarrhea. She denies any urinary symptoms. She states she was feeling in her normal state of health during the day yesterday and states she has been compliant with her medications with no recent medication changes. Review of Systems     Review of Systems   Constitutional: Positive for fatigue. HENT: Negative. Eyes: Negative. Respiratory: Negative. Cardiovascular: Negative. Gastrointestinal: Negative. Genitourinary: Negative. Neurological: Positive for light-headedness. Negative for dizziness. All other systems reviewed and are negative. Past Medical, Surgical, Family, and Social History     She has a past medical history of Cerebral artery occlusion with cerebral infarction [facility]), Dental disease, Hypertension, Rectal prolapse, Sinusitis, and Tinnitus. She has a past surgical history that includes Appendectomy. Her family history includes No Known Problems in her father and mother. She reports that she has never smoked.  She has never used smokeless tobacco. She reports that she does not drink alcohol and does not use drugs.    Medications     Current Discharge Medication List      CONTINUE these medications which have NOT CHANGED    Details   amLODIPine (NORVASC) 5 MG tablet TAKE 1 TABLET BY MOUTH DAILY      pantoprazole (PROTONIX) 40 MG tablet Take 1 tablet by mouth Daily with supper  Qty: 30 tablet, Refills: 5    Associated Diagnoses: Hoarseness or changing voice; Globus pharyngeus; Cough      aspirin EC 81 MG EC tablet Take 1 tablet by mouth daily  Qty: 30 tablet, Refills: 3      atorvastatin (LIPITOR) 80 MG tablet Take 1 tablet by mouth daily  Qty: 30 tablet, Refills: 3      lisinopril (PRINIVIL;ZESTRIL) 10 MG tablet Take 1 tablet by mouth daily  Qty: 30 tablet, Refills: 0             Allergies     She has No Known Allergies. Physical Exam     INITIAL VITALS: BP: (!) 190/84, Temp: 98.4 °F (36.9 °C), Pulse: 84, Resp: 16, SpO2: 96 %   Physical Exam  Vitals and nursing note reviewed. Constitutional:       General: She is not in acute distress. HENT:      Head: Normocephalic and atraumatic. Mouth/Throat:      Mouth: Mucous membranes are moist.      Pharynx: No oropharyngeal exudate. Eyes:      General: No scleral icterus. Extraocular Movements: Extraocular movements intact. Conjunctiva/sclera: Conjunctivae normal.      Pupils: Pupils are equal, round, and reactive to light. Cardiovascular:      Rate and Rhythm: Normal rate and regular rhythm. Heart sounds: Normal heart sounds. Pulmonary:      Effort: Pulmonary effort is normal. No respiratory distress. Breath sounds: Normal breath sounds. No wheezing. Abdominal:      General: Bowel sounds are normal.      Palpations: Abdomen is soft. Tenderness: There is no abdominal tenderness. There is no guarding or rebound. Musculoskeletal:         General: No swelling. Normal range of motion. Cervical back: Normal range of motion and neck supple. Skin:     General: Skin is warm and dry.    Neurological:      General: No focal deficit present. Mental Status: She is alert and oriented to person, place, and time. Cranial Nerves: No cranial nerve deficit. Motor: No weakness. Coordination: Coordination normal.         Diagnostic Results     EKG   EKG as interpreted by me shows patient to be in a normal sinus rhythm with a left axis deviation, normal W[place] and QT intervals, normal QRS duration, no ST segment abnormalities, no T wave abnormalities. RADIOLOGY:  XR CHEST PORTABLE    (Results Pending)       LABS:   Results for orders placed or performed during the hospital encounter of 09/30/21   CBC auto differential   Result Value Ref Range    WBC 5.8 4.0 - 11.0 K/uL    RBC 4.53 4.00 - 5.20 M/uL    Hemoglobin 13.4 12.0 - 16.0 g/dL    Hematocrit 40.5 36.0 - 48.0 %    MCV 89.3 80.0 - 100.0 fL    MCH 29.5 26.0 - 34.0 pg    MCHC 33.0 31.0 - 36.0 g/dL    RDW 14.4 12.4 - 15.4 %    Platelets 013 582 - 000 K/uL    MPV 8.3 5.0 - 10.5 fL    Neutrophils % 61.0 %    Lymphocytes % 27.1 %    Monocytes % 8.1 %    Eosinophils % 2.9 %    Basophils % 0.9 %    Neutrophils Absolute 3.5 1.7 - 7.7 K/uL    Lymphocytes Absolute 1.6 1.0 - 5.1 K/uL    Monocytes Absolute 0.5 0.0 - 1.3 K/uL    Eosinophils Absolute 0.2 0.0 - 0.6 K/uL    Basophils Absolute 0.1 0.0 - 0.2 K/uL   Urinalysis, reflex to microscopic (Lab)   Result Value Ref Range    Urine Type NotGiven    EKG 12 Lead   Result Value Ref Range    Ventricular Rate 72 BPM    Atrial Rate 72 BPM    P-R Interval 172 ms    QRS Duration 76 ms    Q-T Interval 418 ms    QTc Calculation (Bazett) 457 ms    P Axis 63 degrees    R Axis -16 degrees    T Axis 61 degrees    Diagnosis       EKG performed in ER and to be interpreted by ER physician. Confirmed by MD, ER ([id number]),  Augustine Cervantes [phone]) on 9/30/2021 6:16:44 AM     RECENT VITALS:  BP: (!) 190/84,Temp: 98.4 °F (36.9 °C), Pulse: 84, Resp: 16, SpO2: 96 %     Procedures     N/A    ED Course     Nursing Notes, Past Medical Hx, Past Surgical Hx, Social Hx,Allergies, and Family Hx were reviewed. patient was given the following medications:  No orders of the defined types were placed in this encounter. CONSULTS:  None    MEDICAL DECISIONMAKING / ASSESSMENT / Bijal Leonard is a 60 y.o. female with history of hypertension presents complaining of elevated blood pressure. Patient states she felt in her normal state of health this morning but when she got out of the car to go to work she felt somewhat shaky and her blood pressure was elevated. On arrival, patient is hypertensive with blood pressures in the 284 systolic. She has no focal neurologic deficits on exam.  EKG is unremarkable. Laboratory studies are pending. Patient's blood pressure improved to the 292X systolic without intervention. Assuming laboratory studies are unremarkable, I do feel the patient is stable for discharge home. She will be instructed to continue to follow her blood pressure and discuss with her primary care provider any adjustments of her blood pressure medications. If this plan changes, an addendum will be made by the oncoming provider. Clinical Impression     1.  Essential hypertension        Disposition     PATIENT REFERRED TO:  Yong Mcclelland MD  [place] [id number] [facility] [place]  [phone]            DISCHARGE MEDICATIONS:  Current Discharge Medication List          DISPOSITION          Eliot Billingsley MD  09/30/21 [id number]